# Patient Record
Sex: FEMALE | Race: WHITE | NOT HISPANIC OR LATINO | Employment: OTHER | URBAN - METROPOLITAN AREA
[De-identification: names, ages, dates, MRNs, and addresses within clinical notes are randomized per-mention and may not be internally consistent; named-entity substitution may affect disease eponyms.]

---

## 2017-06-29 ENCOUNTER — APPOINTMENT (OUTPATIENT)
Dept: OCCUPATIONAL THERAPY | Facility: CLINIC | Age: 57
End: 2017-06-29
Payer: COMMERCIAL

## 2017-06-29 ENCOUNTER — ALLSCRIPTS OFFICE VISIT (OUTPATIENT)
Dept: OTHER | Facility: OTHER | Age: 57
End: 2017-06-29

## 2017-06-29 DIAGNOSIS — S62.345A: ICD-10-CM

## 2017-06-29 DIAGNOSIS — S62.308A UNSPECIFIED FRACTURE OF OTHER METACARPAL BONE, INITIAL ENCOUNTER FOR CLOSED FRACTURE: ICD-10-CM

## 2017-06-29 PROCEDURE — 97760 ORTHOTIC MGMT&TRAING 1ST ENC: CPT

## 2017-06-30 ENCOUNTER — HOSPITAL ENCOUNTER (OUTPATIENT)
Dept: RADIOLOGY | Facility: HOSPITAL | Age: 57
Discharge: HOME | End: 2017-06-30
Attending: RADIOLOGY
Payer: COMMERCIAL

## 2017-06-30 DIAGNOSIS — Z76.89 REFERRAL OF PATIENT WITHOUT EXAMINATION OR TREATMENT: ICD-10-CM

## 2017-07-26 ENCOUNTER — APPOINTMENT (OUTPATIENT)
Dept: RADIOLOGY | Facility: CLINIC | Age: 57
End: 2017-07-26
Payer: COMMERCIAL

## 2017-07-26 ENCOUNTER — ALLSCRIPTS OFFICE VISIT (OUTPATIENT)
Dept: OTHER | Facility: OTHER | Age: 57
End: 2017-07-26

## 2017-07-26 DIAGNOSIS — S62.308A UNSPECIFIED FRACTURE OF OTHER METACARPAL BONE, INITIAL ENCOUNTER FOR CLOSED FRACTURE: ICD-10-CM

## 2017-07-26 PROCEDURE — 73130 X-RAY EXAM OF HAND: CPT

## 2018-01-12 VITALS — WEIGHT: 205.25 LBS | BODY MASS INDEX: 30.4 KG/M2 | HEIGHT: 69 IN

## 2018-01-14 VITALS
BODY MASS INDEX: 30.07 KG/M2 | HEART RATE: 74 BPM | DIASTOLIC BLOOD PRESSURE: 74 MMHG | SYSTOLIC BLOOD PRESSURE: 115 MMHG | WEIGHT: 203 LBS | HEIGHT: 69 IN

## 2018-03-30 ENCOUNTER — APPOINTMENT (EMERGENCY)
Dept: RADIOLOGY | Facility: HOSPITAL | Age: 58
DRG: 914 | End: 2018-03-30
Payer: COMMERCIAL

## 2018-03-30 ENCOUNTER — APPOINTMENT (INPATIENT)
Dept: RADIOLOGY | Facility: HOSPITAL | Age: 58
DRG: 914 | End: 2018-03-30
Payer: COMMERCIAL

## 2018-03-30 ENCOUNTER — HOSPITAL ENCOUNTER (INPATIENT)
Facility: HOSPITAL | Age: 58
LOS: 3 days | DRG: 914 | End: 2018-04-02
Attending: SURGERY | Admitting: SURGERY
Payer: COMMERCIAL

## 2018-03-30 DIAGNOSIS — F23 ACUTE PSYCHOSIS (HCC): ICD-10-CM

## 2018-03-30 DIAGNOSIS — T14.91XA SUICIDAL BEHAVIOR WITH ATTEMPTED SELF-INJURY (HCC): ICD-10-CM

## 2018-03-30 DIAGNOSIS — S01.01XA LACERATION OF SCALP: ICD-10-CM

## 2018-03-30 DIAGNOSIS — S05.90XA EYE INJURY: ICD-10-CM

## 2018-03-30 DIAGNOSIS — S68.119A: ICD-10-CM

## 2018-03-30 DIAGNOSIS — Z91.89 AT HIGH RISK FOR SELF HARM: ICD-10-CM
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
## 2018-03-30 PROBLEM — T07.XXXA MULTIPLE CONTUSIONS: Status: ACTIVE | Noted: 2018-03-30

## 2018-03-30 LAB
ABO GROUP BLD: NORMAL
AMPHETAMINES SERPL QL SCN: NEGATIVE
ANION GAP SERPL CALCULATED.3IONS-SCNC: 14 MMOL/L (ref 4–13)
APAP SERPL-MCNC: <2 UG/ML (ref 10–30)
APTT PPP: 31 SECONDS (ref 23–35)
BARBITURATES UR QL: NEGATIVE
BASE EXCESS BLDA CALC-SCNC: -5 MMOL/L (ref -2–3)
BASOPHILS # BLD AUTO: 0.01 THOUSANDS/ΜL (ref 0–0.1)
BASOPHILS NFR BLD AUTO: 0 % (ref 0–1)
BENZODIAZ UR QL: NEGATIVE
BLD GP AB SCN SERPL QL: NEGATIVE
BUN SERPL-MCNC: 20 MG/DL (ref 5–25)
CA-I BLD-SCNC: 1.08 MMOL/L (ref 1.12–1.32)
CALCIUM SERPL-MCNC: 8.6 MG/DL (ref 8.3–10.1)
CHLORIDE SERPL-SCNC: 100 MMOL/L (ref 100–108)
CO2 SERPL-SCNC: 18 MMOL/L (ref 21–32)
COCAINE UR QL: NEGATIVE
CREAT SERPL-MCNC: 1 MG/DL (ref 0.6–1.3)
EOSINOPHIL # BLD AUTO: 0.01 THOUSAND/ΜL (ref 0–0.61)
EOSINOPHIL NFR BLD AUTO: 0 % (ref 0–6)
ERYTHROCYTE [DISTWIDTH] IN BLOOD BY AUTOMATED COUNT: 12.1 % (ref 11.6–15.1)
ETHANOL SERPL-MCNC: <3 MG/DL (ref 0–3)
GFR SERPL CREATININE-BSD FRML MDRD: 63 ML/MIN/1.73SQ M
GLUCOSE SERPL-MCNC: 176 MG/DL (ref 65–140)
GLUCOSE SERPL-MCNC: 186 MG/DL (ref 65–140)
HCO3 BLDA-SCNC: 17 MMOL/L (ref 24–30)
HCT VFR BLD AUTO: 32.8 % (ref 34.8–46.1)
HCT VFR BLD CALC: 32 % (ref 34.8–46.1)
HGB BLD-MCNC: 11.7 G/DL (ref 11.5–15.4)
HGB BLDA-MCNC: 10.9 G/DL (ref 11.5–15.4)
INR PPP: 1.3 (ref 0.86–1.16)
LYMPHOCYTES # BLD AUTO: 0.93 THOUSANDS/ΜL (ref 0.6–4.47)
LYMPHOCYTES NFR BLD AUTO: 6 % (ref 14–44)
MCH RBC QN AUTO: 31.5 PG (ref 26.8–34.3)
MCHC RBC AUTO-ENTMCNC: 35.7 G/DL (ref 31.4–37.4)
MCV RBC AUTO: 88 FL (ref 82–98)
METHADONE UR QL: NEGATIVE
MONOCYTES # BLD AUTO: 1.22 THOUSAND/ΜL (ref 0.17–1.22)
MONOCYTES NFR BLD AUTO: 8 % (ref 4–12)
NEUTROPHILS # BLD AUTO: 13.92 THOUSANDS/ΜL (ref 1.85–7.62)
NEUTS SEG NFR BLD AUTO: 86 % (ref 43–75)
NRBC BLD AUTO-RTO: 0 /100 WBCS
OPIATES UR QL SCN: NEGATIVE
PCO2 BLD: 18 MMOL/L (ref 21–32)
PCO2 BLD: 22.6 MM HG (ref 42–50)
PCP UR QL: NEGATIVE
PH BLD: 7.49 [PH] (ref 7.3–7.4)
PLATELET # BLD AUTO: 208 THOUSANDS/UL (ref 149–390)
PLATELET # BLD AUTO: 297 THOUSANDS/UL (ref 149–390)
PMV BLD AUTO: 8.8 FL (ref 8.9–12.7)
PMV BLD AUTO: 9.4 FL (ref 8.9–12.7)
PO2 BLD: 40 MM HG (ref 35–45)
POTASSIUM BLD-SCNC: 3.8 MMOL/L (ref 3.5–5.3)
POTASSIUM SERPL-SCNC: 3.4 MMOL/L (ref 3.5–5.3)
PROTHROMBIN TIME: 16.3 SECONDS (ref 12.1–14.4)
RBC # BLD AUTO: 3.71 MILLION/UL (ref 3.81–5.12)
RH BLD: POSITIVE
SALICYLATES SERPL-MCNC: <3 MG/DL (ref 3–20)
SAO2 % BLD FROM PO2: 80 % (ref 95–98)
SODIUM BLD-SCNC: 131 MMOL/L (ref 136–145)
SODIUM SERPL-SCNC: 132 MMOL/L (ref 136–145)
SPECIMEN EXPIRATION DATE: NORMAL
SPECIMEN SOURCE: ABNORMAL
THC UR QL: NEGATIVE
WBC # BLD AUTO: 16.2 THOUSAND/UL (ref 4.31–10.16)

## 2018-03-30 PROCEDURE — 73130 X-RAY EXAM OF HAND: CPT

## 2018-03-30 PROCEDURE — 36415 COLL VENOUS BLD VENIPUNCTURE: CPT | Performed by: PHYSICIAN ASSISTANT

## 2018-03-30 PROCEDURE — 88305 TISSUE EXAM BY PATHOLOGIST: CPT | Performed by: PATHOLOGY

## 2018-03-30 PROCEDURE — 73560 X-RAY EXAM OF KNEE 1 OR 2: CPT

## 2018-03-30 PROCEDURE — 86901 BLOOD TYPING SEROLOGIC RH(D): CPT | Performed by: SURGERY

## 2018-03-30 PROCEDURE — 99223 1ST HOSP IP/OBS HIGH 75: CPT | Performed by: PHYSICIAN ASSISTANT

## 2018-03-30 PROCEDURE — 5A1935Z RESPIRATORY VENTILATION, LESS THAN 24 CONSECUTIVE HOURS: ICD-10-PCS | Performed by: SURGERY

## 2018-03-30 PROCEDURE — 94760 N-INVAS EAR/PLS OXIMETRY 1: CPT

## 2018-03-30 PROCEDURE — 85025 COMPLETE CBC W/AUTO DIFF WBC: CPT | Performed by: SURGERY

## 2018-03-30 PROCEDURE — 99291 CRITICAL CARE FIRST HOUR: CPT | Performed by: PHYSICIAN ASSISTANT

## 2018-03-30 PROCEDURE — 82947 ASSAY GLUCOSE BLOOD QUANT: CPT

## 2018-03-30 PROCEDURE — 88311 DECALCIFY TISSUE: CPT | Performed by: PATHOLOGY

## 2018-03-30 PROCEDURE — 0BH18EZ INSERTION OF ENDOTRACHEAL AIRWAY INTO TRACHEA, VIA NATURAL OR ARTIFICIAL OPENING ENDOSCOPIC: ICD-10-PCS | Performed by: SURGERY

## 2018-03-30 PROCEDURE — 74177 CT ABD & PELVIS W/CONTRAST: CPT

## 2018-03-30 PROCEDURE — 80329 ANALGESICS NON-OPIOID 1 OR 2: CPT | Performed by: SURGERY

## 2018-03-30 PROCEDURE — 90471 IMMUNIZATION ADMIN: CPT

## 2018-03-30 PROCEDURE — 71045 X-RAY EXAM CHEST 1 VIEW: CPT

## 2018-03-30 PROCEDURE — 90715 TDAP VACCINE 7 YRS/> IM: CPT | Performed by: PHYSICIAN ASSISTANT

## 2018-03-30 PROCEDURE — 72125 CT NECK SPINE W/O DYE: CPT

## 2018-03-30 PROCEDURE — 84295 ASSAY OF SERUM SODIUM: CPT

## 2018-03-30 PROCEDURE — 96374 THER/PROPH/DIAG INJ IV PUSH: CPT

## 2018-03-30 PROCEDURE — 85730 THROMBOPLASTIN TIME PARTIAL: CPT | Performed by: SURGERY

## 2018-03-30 PROCEDURE — 86850 RBC ANTIBODY SCREEN: CPT | Performed by: SURGERY

## 2018-03-30 PROCEDURE — 70450 CT HEAD/BRAIN W/O DYE: CPT

## 2018-03-30 PROCEDURE — 85610 PROTHROMBIN TIME: CPT | Performed by: SURGERY

## 2018-03-30 PROCEDURE — 85049 AUTOMATED PLATELET COUNT: CPT | Performed by: PHYSICIAN ASSISTANT

## 2018-03-30 PROCEDURE — 93005 ELECTROCARDIOGRAM TRACING: CPT

## 2018-03-30 PROCEDURE — 85014 HEMATOCRIT: CPT

## 2018-03-30 PROCEDURE — 73630 X-RAY EXAM OF FOOT: CPT

## 2018-03-30 PROCEDURE — 86900 BLOOD TYPING SEROLOGIC ABO: CPT | Performed by: SURGERY

## 2018-03-30 PROCEDURE — 80048 BASIC METABOLIC PNL TOTAL CA: CPT | Performed by: SURGERY

## 2018-03-30 PROCEDURE — 82803 BLOOD GASES ANY COMBINATION: CPT

## 2018-03-30 PROCEDURE — 0HQFXZZ REPAIR RIGHT HAND SKIN, EXTERNAL APPROACH: ICD-10-PCS | Performed by: ORTHOPAEDIC SURGERY

## 2018-03-30 PROCEDURE — 80307 DRUG TEST PRSMV CHEM ANLYZR: CPT | Performed by: SURGERY

## 2018-03-30 PROCEDURE — 71260 CT THORAX DX C+: CPT

## 2018-03-30 PROCEDURE — 82330 ASSAY OF CALCIUM: CPT

## 2018-03-30 PROCEDURE — 84132 ASSAY OF SERUM POTASSIUM: CPT

## 2018-03-30 PROCEDURE — 80320 DRUG SCREEN QUANTALCOHOLS: CPT | Performed by: SURGERY

## 2018-03-30 PROCEDURE — 99285 EMERGENCY DEPT VISIT HI MDM: CPT

## 2018-03-30 RX ORDER — PROPOFOL 10 MG/ML
5-50 INJECTION, EMULSION INTRAVENOUS
Status: DISCONTINUED | OUTPATIENT
Start: 2018-03-30 | End: 2018-03-30

## 2018-03-30 RX ORDER — VECURONIUM BROMIDE 1 MG/ML
INJECTION, POWDER, LYOPHILIZED, FOR SOLUTION INTRAVENOUS CODE/TRAUMA/SEDATION MEDICATION
Status: COMPLETED | OUTPATIENT
Start: 2018-03-30 | End: 2018-03-30

## 2018-03-30 RX ORDER — FENTANYL CITRATE 50 UG/ML
INJECTION, SOLUTION INTRAMUSCULAR; INTRAVENOUS CODE/TRAUMA/SEDATION MEDICATION
Status: COMPLETED | OUTPATIENT
Start: 2018-03-30 | End: 2018-03-30

## 2018-03-30 RX ORDER — SODIUM CHLORIDE 9 MG/ML
125 INJECTION, SOLUTION INTRAVENOUS CONTINUOUS
Status: DISCONTINUED | OUTPATIENT
Start: 2018-03-30 | End: 2018-03-30 | Stop reason: SDUPTHER

## 2018-03-30 RX ORDER — POTASSIUM CHLORIDE 14.9 MG/ML
20 INJECTION INTRAVENOUS
Status: COMPLETED | OUTPATIENT
Start: 2018-03-30 | End: 2018-03-30

## 2018-03-30 RX ORDER — OXYCODONE HYDROCHLORIDE 10 MG/1
10 TABLET ORAL EVERY 4 HOURS PRN
Status: DISCONTINUED | OUTPATIENT
Start: 2018-03-30 | End: 2018-04-02 | Stop reason: HOSPADM

## 2018-03-30 RX ORDER — PROPOFOL 10 MG/ML
INJECTION, EMULSION INTRAVENOUS CODE/TRAUMA/SEDATION MEDICATION
Status: COMPLETED | OUTPATIENT
Start: 2018-03-30 | End: 2018-03-30

## 2018-03-30 RX ORDER — CHLORHEXIDINE GLUCONATE 0.12 MG/ML
15 RINSE ORAL EVERY 12 HOURS SCHEDULED
Status: DISCONTINUED | OUTPATIENT
Start: 2018-03-30 | End: 2018-03-30

## 2018-03-30 RX ORDER — GINSENG 100 MG
1 CAPSULE ORAL 2 TIMES DAILY
Status: DISCONTINUED | OUTPATIENT
Start: 2018-03-30 | End: 2018-04-02 | Stop reason: HOSPADM

## 2018-03-30 RX ORDER — SUCCINYLCHOLINE CHLORIDE 20 MG/ML
INJECTION INTRAMUSCULAR; INTRAVENOUS CODE/TRAUMA/SEDATION MEDICATION
Status: COMPLETED | OUTPATIENT
Start: 2018-03-30 | End: 2018-03-30

## 2018-03-30 RX ORDER — OXYCODONE HYDROCHLORIDE 5 MG/1
5 TABLET ORAL EVERY 4 HOURS PRN
Status: DISCONTINUED | OUTPATIENT
Start: 2018-03-30 | End: 2018-04-02 | Stop reason: HOSPADM

## 2018-03-30 RX ORDER — ACETAMINOPHEN 325 MG/1
325 TABLET ORAL EVERY 6 HOURS PRN
Status: DISCONTINUED | OUTPATIENT
Start: 2018-03-30 | End: 2018-03-31

## 2018-03-30 RX ORDER — ETOMIDATE 2 MG/ML
INJECTION INTRAVENOUS CODE/TRAUMA/SEDATION MEDICATION
Status: COMPLETED | OUTPATIENT
Start: 2018-03-30 | End: 2018-03-30

## 2018-03-30 RX ORDER — SODIUM CHLORIDE 9 MG/ML
125 INJECTION, SOLUTION INTRAVENOUS CONTINUOUS
Status: DISCONTINUED | OUTPATIENT
Start: 2018-03-30 | End: 2018-03-30

## 2018-03-30 RX ORDER — CHLORHEXIDINE GLUCONATE 0.12 MG/ML
15 RINSE ORAL EVERY 12 HOURS SCHEDULED
Status: DISCONTINUED | OUTPATIENT
Start: 2018-03-30 | End: 2018-03-30 | Stop reason: SDUPTHER

## 2018-03-30 RX ADMIN — TETANUS TOXOID, REDUCED DIPHTHERIA TOXOID AND ACELLULAR PERTUSSIS VACCINE, ADSORBED 0.5 ML: 5; 2.5; 8; 8; 2.5 SUSPENSION INTRAMUSCULAR at 11:20

## 2018-03-30 RX ADMIN — PROPOFOL 40 MCG/KG/MIN: 10 INJECTION, EMULSION INTRAVENOUS at 18:32

## 2018-03-30 RX ADMIN — PROPOFOL 10 MCG/KG/MIN: 10 INJECTION, EMULSION INTRAVENOUS at 11:33

## 2018-03-30 RX ADMIN — CEFAZOLIN SODIUM 2000 MG: 2 SOLUTION INTRAVENOUS at 11:32

## 2018-03-30 RX ADMIN — ETOMIDATE 30 MG: 2 INJECTION INTRAVENOUS at 11:16

## 2018-03-30 RX ADMIN — CEFAZOLIN SODIUM 2000 MG: 2 SOLUTION INTRAVENOUS at 20:08

## 2018-03-30 RX ADMIN — FENTANYL CITRATE 25 MCG/HR: at 12:33

## 2018-03-30 RX ADMIN — VECURONIUM BROMIDE 10 MG: 1 INJECTION, POWDER, LYOPHILIZED, FOR SOLUTION INTRAVENOUS at 11:30

## 2018-03-30 RX ADMIN — FENTANYL CITRATE 100 MCG: 50 INJECTION, SOLUTION INTRAMUSCULAR; INTRAVENOUS at 11:25

## 2018-03-30 RX ADMIN — PROPOFOL 50 MCG/KG/MIN: 10 INJECTION, EMULSION INTRAVENOUS at 17:22

## 2018-03-30 RX ADMIN — PROPOFOL 40 MCG/KG/MIN: 10 INJECTION, EMULSION INTRAVENOUS at 14:37

## 2018-03-30 RX ADMIN — POTASSIUM CHLORIDE 20 MEQ: 200 INJECTION, SOLUTION INTRAVENOUS at 18:01

## 2018-03-30 RX ADMIN — SODIUM CHLORIDE 125 ML/HR: 0.9 INJECTION, SOLUTION INTRAVENOUS at 15:47

## 2018-03-30 RX ADMIN — SUCCINYLCHOLINE CHLORIDE 100 MG: 20 INJECTION, SOLUTION INTRAMUSCULAR; INTRAVENOUS at 11:17

## 2018-03-30 RX ADMIN — IOHEXOL 100 ML: 350 INJECTION, SOLUTION INTRAVENOUS at 11:56

## 2018-03-30 RX ADMIN — POTASSIUM CHLORIDE 20 MEQ: 200 INJECTION, SOLUTION INTRAVENOUS at 15:54

## 2018-03-30 RX ADMIN — PROPOFOL 40 MG: 10 INJECTION, EMULSION INTRAVENOUS at 11:27

## 2018-03-30 RX ADMIN — CHLORHEXIDINE GLUCONATE 15 ML: 1.2 RINSE ORAL at 20:07

## 2018-03-30 NOTE — RESPIRATORY THERAPY NOTE
RT Ventilator Management Note  Venetta Burn 63 y o  female MRN: [id number]  Unit/Bed#: ICU 04 Encounter: [id number]      Daily Screen       3/30/2018 1212             Patient safety screen outcome[de-identified] Failed    Not Ready for Weaning due to[de-identified] Underline problem not resolved            Physical Exam:   Assessment Type: (P) Assess only  General Appearance: Sedated  Respiratory Pattern: (P) Assisted  Chest Assessment: (P) Chest expansion symmetrical  Bilateral Breath Sounds: (P) Clear  R Breath Sounds: (P) Clear  L Breath Sounds: (P) Clear  O2 Device: vent      Resp Comments: (P) Pt found on PSV and is tolerating well  VS are stable @ this time  Will continue to monitor through out the shift
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
## 2018-03-30 NOTE — TRAUMA DOCUMENTATION
Patient states she has 5 minutes to leave her body to fulfill a mission  States she can not have oxygen in her mouth under any circumstances  Patient continuing to stay she is ok and that she needs left alone to leave her body for 5 minutes
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
## 2018-03-30 NOTE — RESPIRATORY THERAPY NOTE
RT Ventilator Management Note  Aleksandr Stephenson 63 y o  female MRN: [id number]  Unit/Bed#: ICU 04 Encounter: [id number]      Daily Screen       3/30/2018 1212             Patient safety screen outcome[de-identified] (P)  Failed    Not Ready for Weaning due to[de-identified] (P)  Underline problem not resolved            Physical Exam:   Assessment Type: Assess only  O2 Device: vent      Resp Comments: pt arrived from ER  Placed on vent   Check done
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
## 2018-03-30 NOTE — SOCIAL WORK
CM was informed by [facility] with Pastoral care that pt's neighbor Adamaris Peace [phone] gave pt's dog to their common   CM will follow

## 2018-03-30 NOTE — ED PROCEDURE NOTE
Procedure  Intubation  Date/Time: 3/30/2018 11:58 AM  Performed by: César Loo by: Christiano Hamm     Patient location:  ED  Consent:     Consent obtained:  Emergent situation  Universal protocol:     Patient identity confirmed:  Arm band  Pre-procedure details:     Patient status:  Altered mental status    Pretreatment medications:  Etomidate    Paralytics:  Succinylcholine  Indications:     Indications for intubation: airway protection    Procedure details:     Preoxygenation:  Nasal cannula    CPR in progress: no      Intubation method:  Oral    Oral intubation technique:  Glidescope    Laryngoscope blade: Mac 3    Tube size (mm):  8 0    Tube type:  Cuffed    Number of attempts:  1    Ventilation between attempts: no      Cricoid pressure: no      Tube visualized through cords: yes    Placement assessment:     ETT to lip:  26    Tube secured with:  ETT galdamez    Breath sounds:  Equal    Placement verification: chest rise, condensation, CXR verification, direct visualization, equal breath sounds and tube exhalation      CXR findings:  ETT in proper place    Ventilator settings:  15/450/50/5  Post-procedure details:     Patient tolerance of procedure:   Tolerated well, no immediate complications                     Kareen Kerns MD  03/30/18 0936

## 2018-03-30 NOTE — PROGRESS NOTES
Rapid drug screen sent after pt   Came from ER and meds were given to pt , told to still send to lab
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
## 2018-03-31 PROBLEM — T14.91XA SUICIDAL BEHAVIOR WITH ATTEMPTED SELF-INJURY (HCC): Status: ACTIVE | Noted: 2018-03-31

## 2018-03-31 PROBLEM — F23 ACUTE PSYCHOSIS (HCC): Status: ACTIVE | Noted: 2018-03-31

## 2018-03-31 PROCEDURE — 99231 SBSQ HOSP IP/OBS SF/LOW 25: CPT | Performed by: ORTHOPAEDIC SURGERY

## 2018-03-31 PROCEDURE — 93005 ELECTROCARDIOGRAM TRACING: CPT

## 2018-03-31 PROCEDURE — 99232 SBSQ HOSP IP/OBS MODERATE 35: CPT | Performed by: SURGERY

## 2018-03-31 RX ORDER — TROPICAMIDE 5 MG/ML
1 SOLUTION/ DROPS OPHTHALMIC ONCE
Status: COMPLETED | OUTPATIENT
Start: 2018-04-01 | End: 2018-04-01

## 2018-03-31 RX ORDER — ACETAMINOPHEN 325 MG/1
650 TABLET ORAL EVERY 6 HOURS PRN
Status: DISCONTINUED | OUTPATIENT
Start: 2018-03-31 | End: 2018-04-02

## 2018-03-31 RX ORDER — LORAZEPAM 2 MG/ML
2 INJECTION INTRAMUSCULAR ONCE
Status: COMPLETED | OUTPATIENT
Start: 2018-03-31 | End: 2018-03-31

## 2018-03-31 RX ORDER — HALOPERIDOL 5 MG/ML
5 INJECTION INTRAMUSCULAR ONCE
Status: COMPLETED | OUTPATIENT
Start: 2018-03-31 | End: 2018-03-31

## 2018-03-31 RX ORDER — TROPICAMIDE 5 MG/ML
1 SOLUTION/ DROPS OPHTHALMIC ONCE
Status: DISCONTINUED | OUTPATIENT
Start: 2018-03-31 | End: 2018-03-31

## 2018-03-31 RX ORDER — HALOPERIDOL 5 MG/ML
5 INJECTION INTRAMUSCULAR EVERY 4 HOURS PRN
Status: DISCONTINUED | OUTPATIENT
Start: 2018-03-31 | End: 2018-04-02

## 2018-03-31 RX ADMIN — HALOPERIDOL LACTATE 5 MG: 5 INJECTION, SOLUTION INTRAMUSCULAR at 07:53

## 2018-03-31 RX ADMIN — HALOPERIDOL LACTATE 5 MG: 5 INJECTION, SOLUTION INTRAMUSCULAR at 00:43

## 2018-03-31 RX ADMIN — CEFAZOLIN SODIUM 2000 MG: 2 SOLUTION INTRAVENOUS at 15:47

## 2018-03-31 RX ADMIN — BACITRACIN ZINC 1 LARGE APPLICATION: 500 OINTMENT TOPICAL at 19:05

## 2018-03-31 RX ADMIN — LORAZEPAM 2 MG: 2 INJECTION INTRAMUSCULAR; INTRAVENOUS at 00:56

## 2018-03-31 RX ADMIN — BACITRACIN ZINC 1 LARGE APPLICATION: 500 OINTMENT TOPICAL at 00:29

## 2018-03-31 RX ADMIN — CEFAZOLIN SODIUM 2000 MG: 2 SOLUTION INTRAVENOUS at 21:17

## 2018-03-31 RX ADMIN — CEFAZOLIN SODIUM 2000 MG: 2 SOLUTION INTRAVENOUS at 04:00

## 2018-03-31 RX ADMIN — ENOXAPARIN SODIUM 30 MG: 30 INJECTION SUBCUTANEOUS at 21:17

## 2018-03-31 RX ADMIN — ENOXAPARIN SODIUM 40 MG: 40 INJECTION SUBCUTANEOUS at 08:24

## 2018-03-31 NOTE — PLAN OF CARE
Problem: Prexisting or High Potential for Compromised Skin Integrity  Goal: Skin integrity is maintained or improved  INTERVENTIONS:  - Identify patients at risk for skin breakdown  - Assess and monitor skin integrity  - Assess and monitor nutrition and hydration status  - Monitor labs (i e  albumin)  - Assess for incontinence   - Turn and reposition patient  - Assist with mobility/ambulation  - Relieve pressure over bony prominences  - Avoid friction and shearing  - Provide appropriate hygiene as needed including keeping skin clean and dry  - Evaluate need for skin moisturizer/barrier cream  - Collaborate with interdisciplinary team (i e  Nutrition, Rehabilitation, etc )   - Patient/family teaching   Outcome: Progressing      Problem: Potential for Falls  Goal: Patient will remain free of falls  INTERVENTIONS:  - Assess patient frequently for physical needs  -  Identify cognitive and physical deficits and behaviors that affect risk of falls    -  [place] fall precautions as indicated by assessment   - Educate patient/family on patient safety including physical limitations  - Instruct patient to call for assistance with activity based on assessment  - Modify environment to reduce risk of injury  - Consider OT/PT consult to assist with strengthening/mobility   Outcome: Progressing      Problem: PAIN - ADULT  Goal: Verbalizes/displays adequate comfort level or baseline comfort level  Interventions:  - Encourage patient to monitor pain and request assistance  - Assess pain using appropriate pain scale  - Administer analgesics based on type and severity of pain and evaluate response  - Implement non-pharmacological measures as appropriate and evaluate response  - Consider cultural and social influences on pain and pain management  - Notify physician/advanced practitioner if interventions unsuccessful or patient reports new pain   Outcome: Progressing      Problem: INFECTION - ADULT  Goal: Absence or prevention of progression during hospitalization  INTERVENTIONS:  - Assess and monitor for signs and symptoms of infection  - Monitor lab/diagnostic results  - Monitor all insertion sites, i e  indwelling lines, tubes, and drains  - Monitor endotracheal (as able) and nasal secretions for changes in amount and color  - [place] appropriate cooling/warming therapies per order  - Administer medications as ordered  - Instruct and encourage patient and family to use good hand hygiene technique  - Identify and instruct in appropriate isolation precautions for identified infection/condition   Outcome: Progressing    Goal: Absence of fever/infection during neutropenic period  INTERVENTIONS:  - Monitor WBC  - Implement neutropenic guidelines   Outcome: Progressing      Problem: SAFETY ADULT  Goal: Maintain or return to baseline ADL function  INTERVENTIONS:  -  Assess patient's ability to carry out ADLs; assess patient's baseline for ADL function and identify physical deficits which impact ability to perform ADLs (bathing, care of mouth/teeth, toileting, grooming, dressing, etc )  - Assess/evaluate cause of self-care deficits   - Assess range of motion  - Assess patient's mobility; develop plan if impaired  - Assess patient's need for assistive devices and provide as appropriate  - Encourage maximum independence but intervene and supervise when necessary  ¯ Involve family in performance of ADLs  ¯ Assess for home care needs following discharge   ¯ Request OT consult to assist with ADL evaluation and planning for discharge  ¯ Provide patient education as appropriate   Outcome: Progressing    Goal: Maintain or return mobility status to optimal level  INTERVENTIONS:  - Assess patient's baseline mobility status (ambulation, transfers, stairs, etc )    - Identify cognitive and physical deficits and behaviors that affect mobility  - Identify mobility aids required to assist with transfers and/or ambulation (gait belt, sit-to-stand, lift, walker, cane, etc )  - [place] fall precautions as indicated by assessment  - Record patient progress and toleration of activity level on Mobility SBAR; progress patient to next Phase/Stage  - Instruct patient to call for assistance with activity based on assessment  - Request Rehabilitation consult to assist with strengthening/weightbearing, etc    Outcome: Progressing      Problem: DISCHARGE PLANNING  Goal: Discharge to home or other facility with appropriate resources  INTERVENTIONS:  - Identify barriers to discharge w/patient and caregiver  - Arrange for needed discharge resources and transportation as appropriate  - Identify discharge learning needs (meds, wound care, etc )  - Arrange for interpretive services to assist at discharge as needed  - Refer to Case Management Department for coordinating discharge planning if the patient needs post-hospital services based on physician/advanced practitioner order or complex needs related to functional status, cognitive ability, or social support system   Outcome: Progressing      Problem: Knowledge Deficit  Goal: Patient/family/caregiver demonstrates understanding of disease process, treatment plan, medications, and discharge instructions  Complete learning assessment and assess knowledge base    Interventions:  - Provide teaching at level of understanding  - Provide teaching via preferred learning methods   Outcome: Progressing      Problem: SAFETY,RESTRAINT: NV/NON-SELF DESTRUCTIVE BEHAVIOR  Goal: Remains free of harm/injury (restraint for non violent/non self-detsructive behavior)  INTERVENTIONS:  - Instruct patient/family regarding restraint use   - Assess and monitor physiologic and psychological status   - Provide interventions and comfort measures to meet assessed patient needs   - Identify and implement measures to help patient regain control  - Assess readiness for release of restraint    Outcome: Progressing    Goal: Returns to optimal restraint-free functioning  INTERVENTIONS:  - Assess the patient's behavior and symptoms that indicate continued need for restraint  - Identify and implement measures to help patient regain control  - Assess readiness for release of restraint    Outcome: Progressing
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
## 2018-03-31 NOTE — SOCIAL WORK
CM faxed Pt's [phone] to [place] upon completion  Pt not medically cleared for d/c at this time  CM will continue to follow
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
## 2018-03-31 NOTE — NURSING NOTE
Pt transferred to [id number] by stretcher  pca and nurse with pt  Waist belt on pt  Backpack and security tags for belongings with pt
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
## 2018-03-31 NOTE — DISCHARGE INSTRUCTIONS
Discharge Instructions - Orthopedics  Lisa Stephenson 63 y o  female MRN: [id number]  Unit/Bed#: ICU 4-01    Weight Bearing Status:                                           Non weight bearing left upper extremity in splint     Pain:  Continue analgesics as directed    Dressing Instructions:   Keep dressing clean, dry and intact until follow up appointment  Appt Instructions: If you do not have your appointment, please call the clinic at [phone] to f/u with Dr Pretty Montanez in 1 week      Contact the office sooner if you experience any increased numbness/tingling in the extremities

## 2018-03-31 NOTE — PROGRESS NOTES
-Orthopedics   Aleksandr Stephenson 63 y o  female MRN: [id number]  Unit/Bed#: ICU 4-01    S: Patient extubated last night  She voices no complaints this morning  Denies numbness or tingling in left hand  Denies pain  States that she is protected by God as she is in a period of atonement  Allergies:   No Known Allergies        Labs:    0  Lab Value Date/Time   HCT 32 8 (L) 03/30/2018 1124   HGB 11 7 03/30/2018 1124   HGB 10 9 (L) 03/30/2018 1105   INR 1 30 (H) 03/30/2018 1124   WBC 16 20 (H) 03/30/2018 1124       Meds:    Current Facility-Administered Medications:     acetaminophen (TYLENOL) tablet 325 mg, 325 mg, Oral, Q6H PRN, Yisel Flores MD    bacitracin topical ointment 1 large application, 1 large application, Topical, BID, Yisel Flores MD, 1 large application at 73/06/82 0029    ceFAZolin (ANCEF) IVPB (premix) 2,000 mg, 2,000 mg, Intravenous, Q8H, Madison Li PA-C, Stopped at 03/31/18 0500    enoxaparin (LOVENOX) subcutaneous injection 40 mg, 40 mg, Subcutaneous, Daily, Rick Castro PA-C    HYDROmorphone (DILAUDID) injection 0 5 mg, 0 5 mg, Intravenous, Q3H PRN, Yisel Flores MD    oxyCODONE (ROXICODONE) immediate release tablet 10 mg, 10 mg, Oral, Q4H PRN, Yisel Flores MD    oxyCODONE (ROXICODONE) IR tablet 5 mg, 5 mg, Oral, Q4H PRN, Yisel Flores MD    Blood Culture:   No results found for: BLOODCX    Wound Culture:   No results found for: WOUNDCULT    Ins and Outs:  I/O last 24 hours:   In: 1586 1 [I V :1264 4; NG/GT:30; IV Piggyback:291 7]  Out: 8468 [Urine:1175]          Physical Exam:   BP (!) 81/43   Pulse 78   Temp 99 7 °F (37 6 °C)   Resp 19   Ht 5' 3" (1 6 m)   Wt 91 5 kg (201 lb 11 5 oz)   SpO2 100%   BMI 35 73 kg/m²   Gen: AAOX3, supine in bed, believes she's in a period of atonement  Musculoskeletal: left upper extremity  · Splint CDI  · Brisk cap refill in remaining digits   · Moving all digits distally in splint   · SILT m/r/u distributions     _*_*_*_*_*_*_*_*_*_*_*_*_*_*_*_*_*_*_*_*_*_*_*_*_*_*_*_*_*_*_*_*_*_*_*_*_*_*_*_*_*    Assessment:  63 y o  female with left Complete ring finger amputation status post bedside washout, revision amputation just distal to level of MCP, and splint application      Plan:   · Maintain finger in sterile dressing, splint  · Analgesics for pain  · Antibiotics- IV ancef   · F/u Dena in 1 week  · Will SO following tertiary exam      Hallie Roche MD
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
## 2018-04-01 LAB
ATRIAL RATE: 67 BPM
ATRIAL RATE: 88 BPM
P AXIS: 46 DEGREES
P AXIS: 56 DEGREES
PR INTERVAL: 138 MS
PR INTERVAL: 150 MS
QRS AXIS: 20 DEGREES
QRS AXIS: 46 DEGREES
QRSD INTERVAL: 75 MS
QRSD INTERVAL: 79 MS
QT INTERVAL: 375 MS
QT INTERVAL: 400 MS
QTC INTERVAL: 423 MS
QTC INTERVAL: 454 MS
T WAVE AXIS: 26 DEGREES
T WAVE AXIS: 44 DEGREES
VENTRICULAR RATE: 67 BPM
VENTRICULAR RATE: 88 BPM

## 2018-04-01 PROCEDURE — 93010 ELECTROCARDIOGRAM REPORT: CPT | Performed by: INTERNAL MEDICINE

## 2018-04-01 PROCEDURE — 99232 SBSQ HOSP IP/OBS MODERATE 35: CPT | Performed by: SURGERY

## 2018-04-01 RX ADMIN — CEFAZOLIN SODIUM 2000 MG: 2 SOLUTION INTRAVENOUS at 05:14

## 2018-04-01 RX ADMIN — CEFAZOLIN SODIUM 2000 MG: 2 SOLUTION INTRAVENOUS at 12:06

## 2018-04-01 RX ADMIN — TROPICAMIDE 1 DROP: 5 SOLUTION/ DROPS OPHTHALMIC at 12:06

## 2018-04-01 RX ADMIN — BACITRACIN ZINC 1 LARGE APPLICATION: 500 OINTMENT TOPICAL at 08:57

## 2018-04-01 RX ADMIN — ENOXAPARIN SODIUM 30 MG: 30 INJECTION SUBCUTANEOUS at 20:03

## 2018-04-01 RX ADMIN — ACETAMINOPHEN 650 MG: 325 TABLET, FILM COATED ORAL at 00:20

## 2018-04-01 RX ADMIN — ENOXAPARIN SODIUM 30 MG: 30 INJECTION SUBCUTANEOUS at 08:57

## 2018-04-01 RX ADMIN — BACITRACIN ZINC 1 LARGE APPLICATION: 500 OINTMENT TOPICAL at 18:23

## 2018-04-01 RX ADMIN — ACETAMINOPHEN 650 MG: 325 TABLET, FILM COATED ORAL at 19:58

## 2018-04-01 NOTE — PLAN OF CARE

## 2018-04-01 NOTE — PLAN OF CARE
DISCHARGE PLANNING     Discharge to home or other facility with appropriate resources Progressing        INFECTION - ADULT     Absence or prevention of progression during hospitalization Progressing     Absence of fever/infection during neutropenic period Progressing        Knowledge Deficit     Patient/family/caregiver demonstrates understanding of disease process, treatment plan, medications, and discharge instructions Progressing        Nutrition/Hydration-ADULT     Nutrient/Hydration intake appropriate for improving, restoring or maintaining nutritional needs Progressing        PAIN - ADULT     Verbalizes/displays adequate comfort level or baseline comfort level Progressing        Potential for Falls     Patient will remain free of falls Progressing        Prexisting or High Potential for Compromised Skin Integrity     Skin integrity is maintained or improved Progressing        SAFETY ADULT     Maintain or return to baseline ADL function Progressing     Maintain or return mobility status to optimal level Progressing        SAFETY,RESTRAINT: NV/NON-SELF DESTRUCTIVE BEHAVIOR     Remains free of harm/injury (restraint for non violent/non self-detsructive behavior) Progressing     Returns to optimal restraint-free functioning Progressing
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
## 2018-04-01 NOTE — PROGRESS NOTES
Trauma Res notified pt occassionally removing Vista collar, pt encouraged to keep collar on  No new orders

## 2018-04-02 ENCOUNTER — HOSPITAL ENCOUNTER (INPATIENT)
Facility: HOSPITAL | Age: 58
LOS: 21 days | Discharge: HOME | DRG: 885 | End: 2018-04-23
Attending: PSYCHIATRY & NEUROLOGY | Admitting: PSYCHIATRY & NEUROLOGY
Payer: COMMERCIAL

## 2018-04-02 VITALS
SYSTOLIC BLOOD PRESSURE: 110 MMHG | HEART RATE: 82 BPM | HEIGHT: 63 IN | BODY MASS INDEX: 35.62 KG/M2 | RESPIRATION RATE: 16 BRPM | DIASTOLIC BLOOD PRESSURE: 64 MMHG | WEIGHT: 201.06 LBS | OXYGEN SATURATION: 96 % | TEMPERATURE: 98.6 F

## 2018-04-02 DIAGNOSIS — R60.9 SWELLING: ICD-10-CM

## 2018-04-02 DIAGNOSIS — S68.119D TRAUMATIC AMPUTATION OF FINGER OF LEFT HAND, SUBSEQUENT ENCOUNTER: ICD-10-CM

## 2018-04-02 DIAGNOSIS — F23 ACUTE PSYCHOSIS (HCC): ICD-10-CM

## 2018-04-02 DIAGNOSIS — I83.891 VARICOSE VEINS OF RIGHT LEG WITH EDEMA: ICD-10-CM

## 2018-04-02 DIAGNOSIS — R60.0 LEG EDEMA, RIGHT: ICD-10-CM

## 2018-04-02 DIAGNOSIS — T07.XXXA MULTIPLE CONTUSIONS: Primary | ICD-10-CM

## 2018-04-02 DIAGNOSIS — R52 PAIN: ICD-10-CM

## 2018-04-02 DIAGNOSIS — G25.1 DRUG-INDUCED TREMOR: ICD-10-CM

## 2018-04-02 DIAGNOSIS — F29 PSYCHOSIS, ATYPICAL (HCC): ICD-10-CM

## 2018-04-02 PROCEDURE — 99238 HOSP IP/OBS DSCHRG MGMT 30/<: CPT | Performed by: NURSE PRACTITIONER

## 2018-04-02 RX ORDER — ACETAMINOPHEN, ASPIRIN AND CAFFEINE 250; 250; 65 MG/1; MG/1; MG/1
2 TABLET, FILM COATED ORAL EVERY 8 HOURS PRN
COMMUNITY
End: 2018-04-23 | Stop reason: HOSPADM

## 2018-04-02 RX ORDER — RISPERIDONE 1 MG/1
1 TABLET, ORALLY DISINTEGRATING ORAL EVERY 8 HOURS PRN
Status: CANCELLED | OUTPATIENT
Start: 2018-04-02

## 2018-04-02 RX ORDER — GINSENG 100 MG
1 CAPSULE ORAL 2 TIMES DAILY
Status: CANCELLED | OUTPATIENT
Start: 2018-04-02

## 2018-04-02 RX ORDER — OLANZAPINE 10 MG/1
10 INJECTION, POWDER, LYOPHILIZED, FOR SOLUTION INTRAMUSCULAR EVERY 8 HOURS PRN
Status: DISCONTINUED | OUTPATIENT
Start: 2018-04-02 | End: 2018-04-23 | Stop reason: HOSPADM

## 2018-04-02 RX ORDER — OXYCODONE HYDROCHLORIDE 10 MG/1
10 TABLET ORAL EVERY 4 HOURS PRN
Status: CANCELLED | OUTPATIENT
Start: 2018-04-02

## 2018-04-02 RX ORDER — HALOPERIDOL 5 MG
5 TABLET ORAL EVERY 8 HOURS PRN
Status: CANCELLED | OUTPATIENT
Start: 2018-04-02

## 2018-04-02 RX ORDER — OLANZAPINE 10 MG/1
5 INJECTION, POWDER, LYOPHILIZED, FOR SOLUTION INTRAMUSCULAR EVERY 8 HOURS PRN
Status: CANCELLED | OUTPATIENT
Start: 2018-04-02

## 2018-04-02 RX ORDER — OXYCODONE HYDROCHLORIDE 5 MG/1
5 TABLET ORAL EVERY 4 HOURS PRN
Status: CANCELLED | OUTPATIENT
Start: 2018-04-02

## 2018-04-02 RX ORDER — RISPERIDONE 1 MG/1
1 TABLET, ORALLY DISINTEGRATING ORAL EVERY 8 HOURS PRN
Status: DISCONTINUED | OUTPATIENT
Start: 2018-04-02 | End: 2018-04-23 | Stop reason: HOSPADM

## 2018-04-02 RX ORDER — LORAZEPAM 1 MG/1
1 TABLET ORAL EVERY 4 HOURS PRN
Status: CANCELLED | OUTPATIENT
Start: 2018-04-02

## 2018-04-02 RX ORDER — BENZTROPINE MESYLATE 1 MG/1
1 TABLET ORAL EVERY 8 HOURS PRN
Status: CANCELLED | OUTPATIENT
Start: 2018-04-02

## 2018-04-02 RX ORDER — ACETAMINOPHEN 325 MG/1
650 TABLET ORAL EVERY 6 HOURS PRN
Status: CANCELLED | OUTPATIENT
Start: 2018-04-02

## 2018-04-02 RX ORDER — HALOPERIDOL 5 MG/ML
5 INJECTION INTRAMUSCULAR EVERY 8 HOURS PRN
Status: DISCONTINUED | OUTPATIENT
Start: 2018-04-02 | End: 2018-04-23 | Stop reason: HOSPADM

## 2018-04-02 RX ORDER — BENZTROPINE MESYLATE 1 MG/ML
1 INJECTION INTRAMUSCULAR; INTRAVENOUS EVERY 8 HOURS PRN
Status: CANCELLED | OUTPATIENT
Start: 2018-04-02

## 2018-04-02 RX ORDER — LORAZEPAM 2 MG/ML
1 INJECTION INTRAMUSCULAR EVERY 6 HOURS PRN
Status: DISCONTINUED | OUTPATIENT
Start: 2018-04-02 | End: 2018-04-23 | Stop reason: HOSPADM

## 2018-04-02 RX ORDER — IBUPROFEN 400 MG/1
400 TABLET ORAL EVERY 8 HOURS PRN
Status: DISCONTINUED | OUTPATIENT
Start: 2018-04-02 | End: 2018-04-23 | Stop reason: HOSPADM

## 2018-04-02 RX ORDER — TRAZODONE HYDROCHLORIDE 50 MG/1
50 TABLET ORAL
Status: CANCELLED | OUTPATIENT
Start: 2018-04-02

## 2018-04-02 RX ORDER — MAGNESIUM HYDROXIDE/ALUMINUM HYDROXICE/SIMETHICONE 120; 1200; 1200 MG/30ML; MG/30ML; MG/30ML
15 SUSPENSION ORAL EVERY 4 HOURS PRN
Status: CANCELLED | OUTPATIENT
Start: 2018-04-02

## 2018-04-02 RX ORDER — LORAZEPAM 1 MG/1
1 TABLET ORAL EVERY 6 HOURS PRN
Status: DISCONTINUED | OUTPATIENT
Start: 2018-04-02 | End: 2018-04-23 | Stop reason: HOSPADM

## 2018-04-02 RX ORDER — GINSENG 100 MG
1 CAPSULE ORAL 2 TIMES DAILY
Status: DISCONTINUED | OUTPATIENT
Start: 2018-04-02 | End: 2018-04-13

## 2018-04-02 RX ORDER — TRAMADOL HYDROCHLORIDE 50 MG/1
50 TABLET ORAL EVERY 6 HOURS PRN
Status: DISCONTINUED | OUTPATIENT
Start: 2018-04-02 | End: 2018-04-23 | Stop reason: HOSPADM

## 2018-04-02 RX ORDER — ZOLPIDEM TARTRATE 5 MG/1
5 TABLET ORAL
Status: DISCONTINUED | OUTPATIENT
Start: 2018-04-02 | End: 2018-04-23 | Stop reason: HOSPADM

## 2018-04-02 RX ORDER — HYDROXYZINE HYDROCHLORIDE 25 MG/1
25 TABLET, FILM COATED ORAL EVERY 4 HOURS PRN
Status: CANCELLED | OUTPATIENT
Start: 2018-04-02

## 2018-04-02 RX ORDER — HALOPERIDOL 5 MG
5 TABLET ORAL EVERY 8 HOURS PRN
Status: DISCONTINUED | OUTPATIENT
Start: 2018-04-02 | End: 2018-04-23 | Stop reason: HOSPADM

## 2018-04-02 RX ORDER — ACETAMINOPHEN 325 MG/1
650 TABLET ORAL EVERY 6 HOURS PRN
Status: DISCONTINUED | OUTPATIENT
Start: 2018-04-02 | End: 2018-04-23 | Stop reason: HOSPADM

## 2018-04-02 RX ORDER — LORAZEPAM 2 MG/ML
1 INJECTION INTRAMUSCULAR EVERY 4 HOURS PRN
Status: CANCELLED | OUTPATIENT
Start: 2018-04-02

## 2018-04-02 RX ADMIN — ACETAMINOPHEN 650 MG: 325 TABLET, FILM COATED ORAL at 02:47

## 2018-04-02 RX ADMIN — ENOXAPARIN SODIUM 30 MG: 30 INJECTION SUBCUTANEOUS at 08:58

## 2018-04-02 RX ADMIN — BACITRACIN ZINC 1 LARGE APPLICATION: 500 OINTMENT TOPICAL at 20:21

## 2018-04-02 RX ADMIN — BACITRACIN ZINC 1 LARGE APPLICATION: 500 OINTMENT TOPICAL at 08:58

## 2018-04-02 NOTE — SOCIAL WORK
CM obtained auth pt's from insurance company in order for pt to d/c to [id number] # [id number]  Good for 5 days  Next Review on 4/6/18  CM will discuss with crisis for when bed is available

## 2018-04-02 NOTE — PROGRESS NOTES
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
## 2018-04-02 NOTE — PLAN OF CARE
Alteration in Thoughts and Perception     Treatment Goal: Gain control of psychotic behaviors/thinking, reduce/eliminate presenting symptoms and demonstrate improved reality functioning upon discharge Not Progressing     Refrain from acting on delusional thinking/internal stimuli Not Progressing     Recognize dysfunctional thoughts, communicate reality-based thoughts at the time of discharge Not Progressing          Alteration in Thoughts and Perception     Verbalize thoughts and feelings Progressing     Agree to be compliant with medication regime, as prescribed and report medication side effects Progressing     Attend and participate in unit activities, including therapeutic, recreational, and educational groups Progressing     Complete daily ADLs, including personal hygiene independently, as able Progressing        Depression     Treatment Goal: Demonstrate behavioral control of depressive symptoms, verbalize feelings of improved mood/affect, and adopt new coping skills prior to discharge Progressing     Refrain from harming self Progressing     Refrain from isolation Progressing        Prexisting or High Potential for Compromised Skin Integrity     Skin integrity is maintained or improved Progressing

## 2018-04-03 PROCEDURE — 99222 1ST HOSP IP/OBS MODERATE 55: CPT | Performed by: PSYCHIATRY & NEUROLOGY

## 2018-04-03 RX ORDER — OLANZAPINE 5 MG/1
5 TABLET ORAL
Status: DISCONTINUED | OUTPATIENT
Start: 2018-04-03 | End: 2018-04-05

## 2018-04-03 RX ADMIN — BACITRACIN ZINC 1 LARGE APPLICATION: 500 OINTMENT TOPICAL at 17:15

## 2018-04-03 RX ADMIN — BACITRACIN ZINC 1 LARGE APPLICATION: 500 OINTMENT TOPICAL at 08:15

## 2018-04-03 NOTE — PLAN OF CARE
Problem: Ineffective Coping  Goal: Participates in unit activities  Interventions:  - Provide therapeutic environment   - Provide required programming   - Redirect inappropriate behaviors    Outcome: Progressing  Pt cooperative in self assessment interview in the dayroom  She had been spontaneously conversing with another female peer about gardening and was appreciative for the conversation  Pt expressed that God will tell her when she is ready to leave the hospital and that she likes the fact that she has been "redeemed " She was unable to state any stressors in her life and is proud of being a Theta Healer  Pt is beginning to take an interest in self care,plans to shower today and care for her hair(hair currently tangled)  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

## 2018-04-03 NOTE — PROGRESS NOTES
Pt calm, cooperative, and continues to be med compliant  Pt denies SI at current  Pt continues to respond to internal stimuli  She reports 'they are talking to me  They tell me what I should and shouldn't discuss ' There were many brief pauses while pt talking to staff  Pt denies that her injuries were ever done with the intent to hurt herself  She states 'I did what I did as part of my atonement  I was fasting for 6 days and this atonement was a process, but it's over now  I broke my toes as part of it as well  That's how it started  They wanted me to break my spine, but I just couldn't do that, but they're okay with it  They understood  I am recovering now  It is all over ' 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

## 2018-04-03 NOTE — CASE MANAGEMENT
CM contacted [address] [place] [address] regarding Pt's 302 dated 3/31/18 and expiring 4/5/18 and [address] [place] [address] [facility] states 1 day extension cannot be granted  CM informed Dr Raciel Abbasi of same and new 2 physician [phone] initiated today  CM met with Pt and reviewed both 302 and 303 commitment process and reviewed 302 and 303 rights with Pt and provided Pt with copy of these rights  CM faxed [phone] and 303 commitment paperwork with [employer] (302/303) to [address] [place] [address] and 303 hearing scheduled for 4/6/18 @ 8 AM via teleconference  Pt informed of 303 date/tiime and that she will have the opportunity to speak with  prior to 303 hearing  Pt states that she is not agreeable to psychotropic medication at this time and refused to sign Tx plan  Pt states she will be in contact with her friends (Nila Draper and Aida) and refused to sign ADRIAN for any natural supports  Pt does not have PCP and uses urgent care for any medical issues or concerns  Pt denies history of drug/alcohol abuse/use and has no prior psych history  Pt states that she feels safe on the unit and denies SI or thoughts/commands to harm herself at this time  Pt states, "I am done with my atonement now- it came over me today and I feel more peaceful now than ever in my life "  Pt states that she has had seen visions and heard voices of [employer] icons ("Markus, God, Mother Jaimee Saravia") and can continue to do so, but denies hearing voices or seeing visions at this time and denies any other  /VH

## 2018-04-03 NOTE — PROGRESS NOTES
Pt is alert, cooperative with care but very pre-occupied with the auditory commands this shift  She denies all s/s when asked but does admit to hearing god, mother Deng Correa, and Liseth Brush talk to her and they give her commands/instructions to follow  She did not give details about the instructions but she did admit to following whatever they tell her and she is in atonement and is making up for sins from her past lives  Pt was seen sitting in her room staring and was found standing by the window in the day room staring for long periods of time  When questioned she said she is meditating and the voices are telling her how to heal herself and others  She was present out in the milieu most of the shift but not social among peers  She did approach me and asked if her friend Miguel A Jessy left her cell phone when she brought the chapstick because she needs her phone to contact people in order to process payroll by Friday  I told her she could call Miguel A Jiménez but never did so this shift  I asked her when these voices started and first she said 1-2 weeks but then as the conversation went on she stated the voices told her back in February that she should start changing her diet to vegan since no animal should ever suffer and the voices want her dog to work along side her to heal people and make up for the sins from past lives  Will continue to monitor

## 2018-04-03 NOTE — PLAN OF CARE
Problem: DISCHARGE PLANNING  Goal: Discharge to home or other facility with appropriate resources  INTERVENTIONS:  - Identify barriers to discharge w/patient and caregiver  - Arrange for needed discharge resources and transportation as appropriate  - Identify discharge learning needs (meds, wound care, etc )  - Arrange for interpretive services to assist at discharge as needed  - Refer to Case Management Department for coordinating discharge planning if the patient needs post-hospital services based on physician/advanced practitioner order or complex needs related to functional status, cognitive ability, or social support system   Outcome: Not Progressing  CM completed SW intake assessment and reviewed Tx plan with Pt and ADRIAN and Pt is refusing to sign Tx and ADRIAN at this time
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
## 2018-04-04 PROCEDURE — 99232 SBSQ HOSP IP/OBS MODERATE 35: CPT | Performed by: PSYCHIATRY & NEUROLOGY

## 2018-04-04 RX ADMIN — BACITRACIN ZINC 1 LARGE APPLICATION: 500 OINTMENT TOPICAL at 18:39

## 2018-04-04 RX ADMIN — BACITRACIN ZINC 1 LARGE APPLICATION: 500 OINTMENT TOPICAL at 08:37

## 2018-04-04 NOTE — DISCHARGE INSTR - OTHER ORDERS
[facility] Department of    Mental Health/Developmental Programs Marni Hutton [address] [facility]  [address] [place] [address] Martha Piedra Services                     [phone]  TDD Line                                           [phone]    ADVOCACY/PEER SUPPORT FOR [address] Martha Murray                               [phone]  Consumer/Family Satisfaction Team    The [address]    [phone]    Disability Rights [place]                             [phone]                                                    [TDD] [phone]                                [address] [facility]  Brennan Rubio [address]  [phone]    The following agencies may provide services to people with no insurance and those with Medical Assistance and Insurance:  [address]         [address] [facility]               [phone]  The [facility]               [phone]  Family Service Association [facility]   [address]  [facility]           [phone]      D&A SELF-HELP SUPPORT GROUPS    Al-[place]  Families of people with addiction       [phone] or [phone]  Alcoholics Anonymous                                           [phone] or [phone]  Cocaine Anonymous                                   [phone]  Codependence Anonymous                           [phone]  Double Trouble Groups ([facility])  - [address] [facility] [address]                   [phone]  Narcotics Anonymous                                 [phone]    [address] [facility] Dept  of Mental Health/Developmental Programs                                                        [phone]  Information and referrals for case management, residential, and employment services  The following agencies provide services to people with no Insurance and those with [place]:  Shellie                             [phone]  [facility]                                    [phone]  [facility] Case Management)  [facility] SITE                       [address]                                     [phone]    The following agencies provide services and supports to people with Medical Assistance and Insurance:  Family Service Association of [facility]* [address] [facility] [address]*                                          [phone]  *Romanian speaking staff available      [address]                          [phone]  Smooth [place]                                   [phone]  press 1  Department of [facility]  [phone] [address] [place] [address] 24-hour Crisis Intervention Services [phone]              [address] [place] [address] Blvd:                [address] [facility] Bl[address]:                  [phone]                 - [place] Line:                              [address]:                  [phone]    [facility]s Crisis Support       [phone]  National Suicide Prevention Lifeline      5-552.669.6295  Tahmina Long [address] after Hours Emergency #            [phone]  press [facility] [place]      [phone]  [address]  (Network of Victim Assistance)  [address] [facility] [address]                      [phone]  24hr  Domestic [place] on Aging Jeimy Yusuf Abuse #) [phone]    SELF-HELP/FAMILY [address]                             [phone]  Peer support, education & socialization in [address] [place] [address]  [phone]  [facility]  Peer Support services that are designed to promote recovery and resiliency  The [place] of [facility] [address]     [phone]  Free Family Education Programs to help family members of individuals with an active addiction  [employer]  Ctr      [phone]  [address] [place] [address]  Ctr   [phone]    [facility] Program    [phone]  [place] SOLDIERS & SAILORS [facility] Consumers Association           [phone]  BC of [facility]                          [phone]  ([facility] on [address] [facility])  Provides individual & family support, education & advocacy  CONTACT Helpline (a Warm Line)          [address] [facility]               [address]             [address]               [address] (DESMOND) [address]                    www desmondbucks  800 [facility], pantry, utility aid & self-sufficiency programs  - [facility]                           [phone]                 - [address]  - [facility]    Department of Public Welfare [phone]  Programs include [employer], [facility]              [phone]    [facility] INFORMATION    [facility] Transport                        [phone]  Applications and appointment scheduling for [employer], Persons with Disabilities Program, Adriana Gonzales, and [employer] [place] [employer]                  [phone]  [facility] FOR W[facility]EN & [facility]ABIES Trains                           [phone]  SEPTA TDD Line                                     [address] [facility]                     [phone]  For individuals who are homeless or experiencing a housing-related crisis  [address]                                 [phone]                 - [address] [place] [address]  Public housing for people with disabilities  Martha Bonner [address] Association (D&A):  Tomi at  [employer]  Projects for Assistance in Transition from Homelessness (SZUG)                                      [phone] [address]                     [phone]                           [facility]                     [address] [facility]               [phone]  [employer] XTHSW            [address] [facility] [address]                [address] [facility]     of [facility]                                    [phone]                 [facility]                                [facility]                              [address] [facility] Kevin Montes               [phone]  Protection from [address] [facility] [address]

## 2018-04-04 NOTE — PLAN OF CARE
Problem: Ineffective Coping  Goal: Participates in unit activities  Interventions:  - Provide therapeutic environment   - Provide required programming   - Redirect inappropriate behaviors    Outcome: Not Progressing  Pt isolating in her room except for meals  She politely refused all groups today  She did come out of her room at lunch yet displayed poor eye contact and told her peers that she was meditating  Pt states that she will be ready to attend groups on day but not yet

## 2018-04-04 NOTE — PROGRESS NOTES
Progress Note - Behavioral Health   Renae Stephenson 63 y o  female MRN: @MRN   Unit/Bed#: NO5 584-00 Encounter: [id number]        SECOND OPINION FOR INJECTABLE PSYCHIATRIC MEDICATIONS    Renae Stephenson 63 y o  female MRN: [id number]  Unit/Bed#: IP3 569-01 Encounter: [id number]      Reason for Admission/Current Symptoms:    Anne-Marie Rabago is a 63 y o  female with a history of bipolar disorder versus schizoaffective disorder who was admitted to the inpatient psychiatric unit on a involuntary 302 commitment basis due to manic symptoms, mixed symptoms of bipolar disorder, signs of acute psychosis, psychotic symptoms, auditory hallucinations, delusional thoughts, bizarre behavior, inability to care for self because of mental illness and severe self mutilation as a response to patient psychotic delusions with command auditory hallucinations    Symptoms prior to admission included self-abusive behavior, self-abusive thoughts, mood swings, racing thoughts, delusional thoughts, delusional thinking with grandiose delusions and Yazidism preoccupation  Chayo Petersen was admitted to [facility] adult Psychiatry Unit to treat her manic psychosis   Second opinion for injectable psychotropic medications was requested by Dr Brigid Puri due to Birdie's refusal to take oral medications  On evaluation by the writer yesterday afternoon Chayo Petersen was [facility] psychotic stating that she cut off her left hand ring finder in order to push herself so she would not be able to wear  a wedding ring even if she get   She also tried to poke her eyes and used a hammer to break her toes as a response to auditory hallucinations of "God and Divine Power", telling her to perform "atonement" to address sins of her previous life, shown to her by "God"   Being terrified by what she saw "doing in her previous life",  being in  psychotic likely oneiroid state while "receiving the information from God" , the patient decided to follow on with the "four voices in one voice of God" advice to "cleanse her" by punishing herself  As a result, she had toes fractures and a loss of one finger on her left hand  Pt continues to express her grandious delusional beliefs of her unsual abilities to heal people at the distcance and communate with Brennon Graciela  In addition, pt stated that her body is "moved by Mother of God and Starlinoe Delvin" in different directions,  A symptom of psychosis , consistent with delusion of influence  Being in deep psychotic state the patient has nil insight into her psychiatric condition  Mental Status Evaluation:    Appearance:  age appropriate, dressed in hospital attire, missing one finger onher left hand   Behavior:  pleasant, bizarre   Speech:  increased rate, pressured, hypertalkative   Mood:  anxious, euphoric, manic   Affect:  labile, overbright, inappropriate, increased in intensity, increased in range   Thought Process:  illogical, racing of thoughts   Associations: tangential associations   Thought Content:  grandiose, bizarre, somatic and influence delusions   Perceptual Disturbances: auditory hallucinations of Divine Power and Mother of God   Risk Potential: Suicidal ideation - None at present  Homicidal ideation - None  Potential for aggression - No   Sensorium:  oriented to person, place, time/date and situation   Memory:  recent and remote memory grossly intact   Consciousness:  alert and awake    Attention: decreased concentration   Insight:  impaired due to psychosis   Judgment: impaired due to psychosis   Gait/Station: in bed   Motor Activity: no abnormal movements       Vital signs in last 24 hours:    Temp:  [97 8 °F (36 6 °C)-99 3 °F (37 4 °C)] 97 8 °F (36 6 °C)  HR:  [65-77] 77  Resp:  [18] 18  BP: ()/(53-65) 106/65    Labs: I have personally reviewed all pertinent laboratory/tests results    Most Recent Labs:   Lab Results   Component Value Date    WBC 16 20 (H) 03/30/2018    RBC 3 71 (L) 03/30/2018    HGB 11 7 03/30/2018    HCT 32 8 (L) 03/30/2018     03/30/2018    RDW 12 1 03/30/2018    NEUTROABS 13 92 (H) 03/30/2018     (L) 03/30/2018    K 3 4 (L) 03/30/2018     03/30/2018    CO2 18 (L) 03/30/2018    BUN 20 03/30/2018    CREATININE 1 00 03/30/2018    GLUCOSE 176 (H) 03/30/2018    CALCIUM 8 6 03/30/2018       Medications: All current active medications have been reviewed  Current medications:    Current Facility-Administered Medications:  acetaminophen 650 mg Oral Q6H PRN Kalina Collar, MD   bacitracin 1 large application Topical BID SANDRA Pepper   haloperidol 5 mg Oral Q8H PRN Juluis Collar, MD   haloperidol lactate 5 mg Intramuscular Q8H PRN Juluis Collar, MD   ibuprofen 400 mg Oral Q8H PRN Juluis Collar, MD   LORazepam 1 mg Intramuscular Q6H PRN Kalina Collar, MD   LORazepam 1 mg Oral Q6H PRN Juluis Collar, MD   OLANZapine 10 mg Intramuscular Q8H PRN Juluis Collar, MD   OLANZapine 5 mg Oral HS Juluis Collar, MD   risperiDONE 1 mg Oral Q8H PRN Juluis Collar, MD   traMADol 50 mg Oral Q6H PRN Juluis Collar, MD   zolpidem 5 mg Oral HS PRN Kalina Bustamante, MD       Assessment/Plan   Principal Problem:    Acute psychosis      Recommended Treatment:     I agree with the need for injectable antipsychotic medications if the she refuses oral medications  At this time she is acutely psychotic and does not have a capacity to make decisions regarding refusal of necessary psychiatric treatment  Without treatment she poses a danger to self due to inability to care for her basic needs  she refuses treatment and does not have understanding of risks of treatment refusal or benefits of treatment at this time    she is not likely to improve without medications and will require administration of injectable medications against her will to assure safety of self and others   Dangerousness of severe life threatening self mutilation persist because of psychotic state with poor insight  ** Please Note: This note has been constructed using a voice recognition system   **

## 2018-04-04 NOTE — PROGRESS NOTES
Pt cooperative with care  Out for meals and social upon approach  Denies SI currently  When questioned about her injuries she continues to state her "atonement is over" and denies intention of hurting herself  Did not attend evening group  Will continue to monitor closely

## 2018-04-04 NOTE — PLAN OF CARE
Problem: DISCHARGE PLANNING  Goal: Discharge to home or other facility with appropriate resources  INTERVENTIONS:  - Identify barriers to discharge w/patient and caregiver  - Arrange for needed discharge resources and transportation as appropriate  - Identify discharge learning needs (meds, wound care, etc )  - Arrange for interpretive services to assist at discharge as needed  - Refer to Case Management Department for coordinating discharge planning if the patient needs post-hospital services based on physician/advanced practitioner order or complex needs related to functional status, cognitive ability, or social support system   Outcome: Not Progressing  Pt refuses to sign Tx plan or any ADRIAN's at this time

## 2018-04-04 NOTE — CASE MANAGEMENT
CM contacted Nilo Villatoro @ [facility] [address] (Delegate) and Miryam Rao approved 302/303 to be held by [address] [place] [address] 4/6/18 with [facility] [address] being billed by [facility] Dariel PEREA to hold hearing and requested that completed 302/303 be faxed to [facility] by [address]  CM contacted Scott Fontanez @ [address] [place] [address] [address] and informed Jessica Grider of above and provided Daniela with [facility] [address] fax [phone] in order to fax completed 302/303 and Jessica Grider states she will do the same  Copy of 302 and 303 commitments sent for scan into EHR  CM met with Pt and Pt continues to deny any necessity to be hospitalized, pleasant but with delayed responses in conversation and presents preoccupied and states, "today I am in meditation with God, he is going thru my past lives and experiences with me and communicating with me but I am done with my atonement " Pt denies SI and denies command messages to harm self or others  Pt continues to refuse to sign Tx plan but CM provided Pt with copy of same  Pt continues to refuse to sign ADRIAN but CM received call from Pt's friend and co-worker (Angela Bright) requesting to speak directly with Pt regarding payroll issues at [employer] in [address] [place] [address])  CM gave phone to Pt and Pt spoke directly with 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 informed this writer that she has contacted an  to determine how she [place] [employer]) should proceed with handling Pt's business affairs while Pt is hospitalized and [facility] states that she is refusing to go to Pt's home to retrieve any business documents stating, "I hear the place is a mess because of what she did to herself before she was admitted and I don't feel comfortable going in their on my own "

## 2018-04-04 NOTE — CASE MANAGEMENT
Notification of Discharge  This is a Notification of Discharge from our facility [address]  Please be advised that this patient has been discharge from our facility  Below you will find the admission and discharge date and time including the patients disposition  PRESENTATION DATE: 3/30/2018 11:00 AM  IP ADMISSION DATE: 3/30/18 1141  DISCHARGE DATE: 4/2/2018 12:29 PM  DISPOSITION: [address] [facility] [place], operated by [facility] in the [facility] by [address] for 2017  Network Utilization Review Department  Phone: [phone]; Fax [phone]  ATTENTION: The Network Utilization Review Department is now centralized for our 7 Facilities  Make a note that we have a new phone and fax numbers for our Department  Please call with any questions or concerns to [phone] and carefully follow the prompts so that you are directed to the right person  All voicemails are confidential  Fax any determinations, approvals, denials, and requests for initial or continue stay review clinical to [phone]  Due to HIGH CALL volume, it would be easier if you could please send faxed requests to expedite your requests and in part, help us provide discharge notifications faster

## 2018-04-04 NOTE — ED NOTES
John Beach from [facility] seeking disposition  Informed of admission to [address] [place] [address]

## 2018-04-04 NOTE — PROGRESS NOTES
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

## 2018-04-05 PROCEDURE — 99232 SBSQ HOSP IP/OBS MODERATE 35: CPT | Performed by: PSYCHIATRY & NEUROLOGY

## 2018-04-05 PROCEDURE — 99232 SBSQ HOSP IP/OBS MODERATE 35: CPT | Performed by: PHYSICIAN ASSISTANT

## 2018-04-05 RX ORDER — OLANZAPINE 5 MG/1
5 TABLET ORAL DAILY
Status: DISCONTINUED | OUTPATIENT
Start: 2018-04-05 | End: 2018-04-06

## 2018-04-05 RX ORDER — OLANZAPINE 10 MG/1
5 INJECTION, POWDER, LYOPHILIZED, FOR SOLUTION INTRAMUSCULAR DAILY
Status: DISCONTINUED | OUTPATIENT
Start: 2018-04-05 | End: 2018-04-06

## 2018-04-05 RX ORDER — OLANZAPINE 10 MG/1
5 INJECTION, POWDER, LYOPHILIZED, FOR SOLUTION INTRAMUSCULAR
Status: DISCONTINUED | OUTPATIENT
Start: 2018-04-05 | End: 2018-04-05

## 2018-04-05 RX ADMIN — OLANZAPINE 5 MG: 10 INJECTION, POWDER, FOR SOLUTION INTRAMUSCULAR at 14:13

## 2018-04-05 RX ADMIN — WATER 10 ML: 1 INJECTION INTRAMUSCULAR; INTRAVENOUS; SUBCUTANEOUS at 14:19

## 2018-04-05 NOTE — PLAN OF CARE
Problem: Ineffective Coping  Goal: Participates in unit activities  Interventions:  - Provide therapeutic environment   - Provide required programming   - Redirect inappropriate behaviors    Outcome: Not Progressing  Pt  refused all groups again today  Chcse to read in her room alone then sat in day room over noon meal and stared blankly  Pt reports that she was meditating  Pt needed several cues to repsond verbally to staff

## 2018-04-05 NOTE — PLAN OF CARE
Alteration in Thoughts and Perception     Treatment Goal: Gain control of psychotic behaviors/thinking, reduce/eliminate presenting symptoms and demonstrate improved reality functioning upon discharge Not Progressing     Refrain from acting on delusional thinking/internal stimuli Not Progressing     Agree to be compliant with medication regime, as prescribed and report medication side effects Not Progressing     Recognize dysfunctional thoughts, communicate reality-based thoughts at the time of discharge Not Progressing          Alteration in Thoughts and Perception     Verbalize thoughts and feelings Progressing     Attend and participate in unit activities, including therapeutic, recreational, and educational groups Progressing     Complete daily ADLs, including personal hygiene independently, as able Progressing        Depression     Treatment Goal: Demonstrate behavioral control of depressive symptoms, verbalize feelings of improved mood/affect, and adopt new coping skills prior to discharge Progressing     Refrain from harming self Progressing     Refrain from isolation Progressing        Prexisting or High Potential for Compromised Skin Integrity     Skin integrity is maintained or improved Progressing

## 2018-04-05 NOTE — PROGRESS NOTES
Pt reporting "wierd sensation" under bandage of L ring finger  Bandage was loosened and reapplied per pt request  Pain medication was offered and refused

## 2018-04-05 NOTE — PROGRESS NOTES
Pt noted to have striped the bed from all linens and was naked laying down on her bed  This nurse advised pt to put clothes on d/t male peers  Pt was non-verbal however pt was able to confirm she need time to mediate and then she will put clothes back on  This nurse gave her 10mins and provided privacy  Pt was compliant  Denies SI/HI/AH/VH   Monitoring

## 2018-04-05 NOTE — CASE MANAGEMENT
CM met with Pt and discussed 303 hearing scheduled for 4/6/18 @ 8 AM,  Tx plan and ADRIAN to communicate with her friends (Maricruz Llamas and Aida) both of whom she identifies as strong social supports and Pt continues to refuse to sign Tx plan or ADRIAN at this time  Pt states, "I do not believe I need medication and I don't think I need any sort of mental health treatment- there is nothing wrong with me, I feel fine " Pt states that she does not want to sign an ADRIAN for her friends "if there's anything they need to know or do for me, I will tell them " Pt states that she spoke with her friend/coworker Lummi (CREEK) [facility]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

## 2018-04-05 NOTE — PLAN OF CARE
Problem: DISCHARGE PLANNING  Goal: Discharge to home or other facility with appropriate resources  INTERVENTIONS:  - Identify barriers to discharge w/patient and caregiver  - Arrange for needed discharge resources and transportation as appropriate  - Identify discharge learning needs (meds, wound care, etc )  - Arrange for interpretive services to assist at discharge as needed  - Refer to Case Management Department for coordinating discharge planning if the patient needs post-hospital services based on physician/advanced practitioner order or complex needs related to functional status, cognitive ability, or social support system   Outcome: Not Progressing  Pt refusing to sign Tx plan, ADRIAN's and is not participating in treatment  303 hearing to be held 4/6/18 @ 8 AM with [place] [address] via teleconference

## 2018-04-05 NOTE — PROGRESS NOTES
Orthopaedic team was called for follow up examination of left ring finger amputation  Patient does not want to be examined today  Please call back in the next week for re-examination of finger and removal of sutures when patient agreeable to examination  Please call with any additional questions as well

## 2018-04-05 NOTE — PROGRESS NOTES
Progress Note - Behavioral Health   Jeannette Stephenson 63 y o  female MRN: [id number]  Unit/Bed#: CK5 031-85 Encounter: [id number]    Patient seen, chart reviewed, discussed with staff  Nursing staff notes that the patient was isolative to self for much of the day yesterday  The patient only left her room for meals  Patient refused her p o  Zyprexa last night  No aggressive or agitated behavior  On approach this morning the patient is guarded with her response and vague  She states that she was in meditation with God yesterday  She remains bizarre and delusional with [employer] preoccupations  She talked about her  six-day fast without eating or drinking anything prior to coming to the hospital   She states that she has been trying to eat small amounts of fruit so she does not overwhelm her system  She denies any anxiety or depression  She denies any suicidal or homicidal ideation  She denies her self-harm being of suicide attempts  Physically she denies any complaints or concerns  She is superficial in her response to questions and vague  Patient lacks insight into her recent self-harm behavior and reason for hospitalization      Behavior over the last 24 hours:  unchanged  Sleep: insomnia  Appetite: poor  Medication side effects: No  ROS: no complaints  /63 (BP Location: Right arm)   Pulse 78   Temp 98 7 °F (37 1 °C) (Tympanic)   Resp 18   Ht 5' 8" (1 727 m)   Wt 88 kg (194 lb 0 1 oz)   SpO2 96%   BMI 29 50 kg/m²     Medications:   Current Facility-Administered Medications   Medication Dose Route Frequency    acetaminophen (TYLENOL) tablet 650 mg  650 mg Oral Q6H PRN    bacitracin topical ointment 1 large application  1 large application Topical BID    haloperidol (HALDOL) tablet 5 mg  5 mg Oral Q8H PRN    haloperidol lactate (HALDOL) injection 5 mg  5 mg Intramuscular Q8H PRN    ibuprofen (MOTRIN) tablet 400 mg  400 mg Oral Q8H PRN    LORazepam (ATIVAN) 2 mg/mL injection 1 mg 1 mg Intramuscular Q6H PRN    LORazepam (ATIVAN) tablet 1 mg  1 mg Oral Q6H PRN    OLANZapine (ZyPREXA) IM injection 10 mg  10 mg Intramuscular Q8H PRN    OLANZapine (ZyPREXA) IM injection 5 mg  5 mg Intramuscular HS PRN    OLANZapine (ZyPREXA) tablet 5 mg  5 mg Oral HS    risperiDONE (RisperDAL M-TABS) dispersible tablet 1 mg  1 mg Oral Q8H PRN    traMADol (ULTRAM) tablet 50 mg  50 mg Oral Q6H PRN    zolpidem (AMBIEN) tablet 5 mg  5 mg Oral HS PRN       Labs:   No visits with results within 1 Day(s) from this visit     Latest known visit with results is:   Admission on 03/30/2018, Discharged on 04/02/2018   Component Date Value Ref Range Status    ph, [facility] ISTAT 03/30/2018 7 486* 7 300 - 7 400 Final    pCO2, [facility] i-STAT 03/30/2018 22 6* 42 0 - 50 0 mm HG Final    pO2, [facility] i-STAT 03/30/2018 40 0  35 0 - 45 0 mm HG Final    BE, i-STAT 03/30/2018 -5* -2 - 3 mmol/L Final    HCO3, [facility] i-STAT 03/30/2018 17 0* 24 0 - 30 0 mmol/L Final    CO2, i-STAT 03/30/2018 18* 21 - 32 mmol/L Final    O2 Sat, i-STAT 03/30/2018 80* 95 - 98 % Final    SODIUM, I-STAT 03/30/2018 131* 136 - 145 mmol/l Final    Potassium, i-STAT 03/30/2018 3 8  3 5 - 5 3 mmol/L Final    Calcium, Ionized i-STAT 03/30/2018 1 08* 1 12 - 1 32 mmol/L Final    Hct, i-STAT 03/30/2018 32* 34 8 - 46 1 % Final    Hgb, i-STAT 03/30/2018 10 9* 11 5 - 15 4 g/dl Final    Glucose, i-STAT 03/30/2018 186* 65 - 140 mg/dl Final    Specimen Type 03/30/2018 VENOUS   Final    WBC 03/30/2018 16 20* 4 31 - 10 16 Thousand/uL Final    RBC 03/30/2018 3 71* 3 81 - 5 12 Million/uL Final    Hemoglobin 03/30/2018 11 7  11 5 - 15 4 g/dL Final    Hematocrit 03/30/2018 32 8* 34 8 - 46 1 % Final    MCV 03/30/2018 88  82 - 98 fL Final    MCH 03/30/2018 31 5  26 8 - 34 3 pg Final    MCHC 03/30/2018 35 7  31 4 - 37 4 g/dL Final    RDW 03/30/2018 12 1  11 6 - 15 1 % Final    MPV 03/30/2018 8 8* 8 9 - 12 7 fL Final    Platelets 63/19/6883 297  149 - 390 Thousands/uL Final    nRBC 03/30/2018 0  /100 WBCs Final    Neutrophils Relative 03/30/2018 86* 43 - 75 % Final    Lymphocytes Relative 03/30/2018 6* 14 - 44 % Final    Monocytes Relative 03/30/2018 8  4 - 12 % Final    Eosinophils Relative 03/30/2018 0  0 - 6 % Final    Basophils Relative 03/30/2018 0  0 - 1 % Final    Neutrophils Absolute 03/30/2018 13 92* 1 85 - 7 62 Thousands/µL Final    Lymphocytes Absolute 03/30/2018 0 93  0 60 - 4 47 Thousands/µL Final    Monocytes Absolute 03/30/2018 1 22  0 17 - 1 22 Thousand/µL Final    Eosinophils Absolute 03/30/2018 0 01  0 00 - 0 61 Thousand/µL Final    Basophils Absolute 03/30/2018 0 01  0 00 - 0 10 Thousands/µL Final    Sodium 03/30/2018 132* 136 - 145 mmol/L Final    Potassium 03/30/2018 3 4* 3 5 - 5 3 mmol/L Final    Chloride 03/30/2018 100  100 - 108 mmol/L Final    CO2 03/30/2018 18* 21 - 32 mmol/L Final    Anion Gap 03/30/2018 14* 4 - 13 mmol/L Final    BUN 03/30/2018 20  5 - 25 mg/dL Final    Creatinine 03/30/2018 1 00  0 60 - 1 30 mg/dL Final    Glucose 03/30/2018 176* 65 - 140 mg/dL Final    Calcium 03/30/2018 8 6  8 3 - 10 1 mg/dL Final    eGFR 03/30/2018 63  ml/min/1 73sq m Final    Protime 03/30/2018 16 3* 12 1 - 14 4 seconds Final    INR 03/30/2018 1 30* 0 86 - 1 16 Final    PTT 03/30/2018 31  23 - 35 seconds Final    ABO Grouping 03/30/2018 O   Final    Rh Factor 03/30/2018 Positive   Final    Antibody Screen 03/30/2018 Negative   Final    Specimen Expiration Date 03/30/2018 [id number]   Final    Amph/Meth UR 03/30/2018 Negative  Negative Final    Barbiturate Ur 03/30/2018 Negative  Negative Final    Benzodiazepine Urine 03/30/2018 Negative  Negative Final    Cocaine Urine 03/30/2018 Negative  Negative Final    Methadone Urine 03/30/2018 Negative  Negative Final    Opiate Urine 03/30/2018 Negative  Negative Final    PCP Ur 03/30/2018 Negative  Negative Final    THC Urine 03/30/2018 Negative  Negative Final    Ethanol Lvl 03/30/2018 <3  0 - 3 mg/dL Final    Salicylate Lvl 27/03/7165 <3* 3 - 20 mg/dL Final    Acetaminophen Level 03/30/2018 <2* 10 - 30 ug/mL Final    Case Report 03/30/2018    Final                    Value:Surgical Pathology Report                         Case: [id number]                                   Authorizing Provider:  Bee Jason PA-C    Collected:           03/30/2018 1434              Ordering Location:     [address] [place] [address]      Received:            03/30/2018 [facility]                                                                             Unit                                                                         Pathologist:           Dima Hart MD                                                         Specimen:    Finger, Left                                                                               Final Diagnosis 03/30/2018    Final                    Value: This result contains rich text formatting which cannot be displayed here   Additional Information 03/30/2018    Final                    Value: This result contains rich text formatting which cannot be displayed here  Mi Rein Description 03/30/2018    Final                    Value: This result contains rich text formatting which cannot be displayed here      Platelets 50/37/9411 208  149 - 390 Thousands/uL Final    MPV 03/30/2018 9 4  8 9 - 12 7 fL Final    Ventricular Rate 03/31/2018 88  BPM Final    Atrial Rate 03/31/2018 88  BPM Final    CO Interval 03/31/2018 150  ms Final    QRSD Interval 03/31/2018 79  ms Final    QT Interval 03/31/2018 375  ms Final    QTC Interval 03/31/2018 454  ms Final    P Axis 03/31/2018 46  degrees Final    QRS Axis 03/31/2018 20  degrees Final    T Wave [place] 03/31/2018 26  degrees Final    Ventricular Rate 03/30/2018 67  BPM Final    Atrial Rate 03/30/2018 67  BPM Final    CO Interval 03/30/2018 138  ms Final    QRSD Interval 03/30/2018 75  ms Final    QT Interval 03/30/2018 400  ms Final    QTC Interval 03/30/2018 423  ms Final    P Axis 03/30/2018 56  degrees Final    QRS Axis 03/30/2018 46  degrees Final    T Wave Axis 03/30/2018 44  degrees Final       Mental Status Evaluation:  Appearance:  age appropriate, disheveled and Dressing in place on left hand   Behavior:  evasive, guarded and Bizarre   Speech:  normal pitch and normal volume   Mood:  euphoric   Affect:  constricted   Thought Process:  illogical   Thought Content:  delusions  grandiose, somatic and Bizarre   Perceptual Disturbances: Auditory hallucinations of Divine power   Risk Potential: Suicidal Ideations none and Homicidal Ideations none   Sensorium:  person, place and time/date   Cognition:  grossly intact   Consciousness:  alert and awake    Attention: attention span appeared shorter than expected for age   Insight:  impaired due to Psychosis   Judgment: impaired due to Psychosis   Gait/Station: normal gait/station   Motor Activity: no abnormal movements     Progress Toward Goals:  No improvement    Assessment/Plan   Principal Problem:    Acute psychosis      Recommended Treatment:   Second opinion to force medications was completed yesterday by Dr Adore Garcia  Olanzapine 5 mg IM for refusal of p  o  at HS  Continue to encourage in milieu participations and group therapies  Continue with group therapy, milieu therapy and occupational therapy  Risks, benefits and possible side effects of Medications:   Patient does not verbalize understanding at this time and will require further explanation  Counseling / Coordination of Care  Total floor / unit time spent today 25 minutes  Greater than 50% of total time was spent with the patient and / or family counseling and / or coordination of care  A description of the counseling / coordination of care:  Medication management, chart review, patient interview

## 2018-04-05 NOTE — PROGRESS NOTES
Pt remained in her room this shift  OOB for meals only and retreated back to room straight after  Pt calm and pleasant  Pt refused to participate in group this evening and refused her 2200 medication  Pt currently denies any SI/HI  Will continue to monitor

## 2018-04-06 PROCEDURE — 99232 SBSQ HOSP IP/OBS MODERATE 35: CPT | Performed by: PSYCHIATRY & NEUROLOGY

## 2018-04-06 RX ORDER — OLANZAPINE 10 MG/1
10 TABLET ORAL DAILY
Status: DISCONTINUED | OUTPATIENT
Start: 2018-04-07 | End: 2018-04-09

## 2018-04-06 RX ORDER — OLANZAPINE 10 MG/1
10 INJECTION, POWDER, LYOPHILIZED, FOR SOLUTION INTRAMUSCULAR DAILY
Status: DISCONTINUED | OUTPATIENT
Start: 2018-04-07 | End: 2018-04-09

## 2018-04-06 RX ADMIN — OLANZAPINE 5 MG: 10 INJECTION, POWDER, FOR SOLUTION INTRAMUSCULAR at 09:06

## 2018-04-06 RX ADMIN — WATER 10 ML: 1 INJECTION INTRAMUSCULAR; INTRAVENOUS; SUBCUTANEOUS at 09:06

## 2018-04-06 RX ADMIN — BACITRACIN ZINC 1 LARGE APPLICATION: 500 OINTMENT TOPICAL at 08:37

## 2018-04-06 NOTE — PLAN OF CARE
Problem: Ineffective Coping  Goal: Participates in unit activities  Interventions:  - Provide therapeutic environment   - Provide required programming   - Redirect inappropriate behaviors    Outcome: Progressing  Pt did remain in the day room for morning group,but sat with eyes closed and reported that she was speaking with God  Pt refused to attend other sessions today

## 2018-04-06 NOTE — PLAN OF CARE
Problem: DISCHARGE PLANNING  Goal: Discharge to home or other facility with appropriate resources  INTERVENTIONS:  - Identify barriers to discharge w/patient and caregiver  - Arrange for needed discharge resources and transportation as appropriate  - Identify discharge learning needs (meds, wound care, etc )  - Arrange for interpretive services to assist at discharge as needed  - Refer to Case Management Department for coordinating discharge planning if the patient needs post-hospital services based on physician/advanced practitioner order or complex needs related to functional status, cognitive ability, or social support system   Outcome: Not Progressing  Discharge barrier: Pt refusing to sign Tx plan, ADRIAN's and is not participating in treatment, meds being administered over objection

## 2018-04-06 NOTE — CASE MANAGEMENT
303 hearing held today @ 8:20 AM with [facility], [place] [address] via teleconference and Pt in attendance and provided self-testimony and requested to be discharged; case was reviewed and CM was notified by [facility] [place] [address] that 303 was upheld and Pt is now on 303 for up to 20 days and expires 4/25/18  CM informed Pt of 303 outcome and up to 20 day inpatient commitment and provided copy of 303 to Pt  Copy of completed 303 received from [facility] [place] sent for scan to EHR  CM attempted to review Tx plan with Pt and Pt with bizarre affect and delayed responses stating, "I am in meditation today " Pt continues to refuse to sign Tx plan or ADRIAN and is refusing medication and recommended labs and required meds over objection 4/5/18 and 4/6/18

## 2018-04-06 NOTE — PROGRESS NOTES
Pt remained seclusive to her room  Guarded in conversation  Kept stating she didn't want to talk about things right now  Refused to go to groups  States she wants privacy because she is communicating with God  Pt is responding to internal stimuli  Denies VH and SI  Left wrist ace wrap is clean and intact  Visible in milieu for meals only  Will continue to monitor

## 2018-04-06 NOTE — PROGRESS NOTES
Pt refused po med this a m  States she knows her rights and this is her choice  Several attempts made with different staff  She continues to refuse  Given IM zyprexa in Left deltoid  Advised pt to use alternate site  She refused and stated the left arm was just fine
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
## 2018-04-06 NOTE — PROGRESS NOTES
Patient seclusive to room tonight saying she needed to meditates  Norma Thomas is very scant and has minimal response on no response to questions; eyes closed during interaction  Left wrist splint/ace wrap intact  Fingers warm with good capillary refill  Denies SI/HI at this time

## 2018-04-07 PROCEDURE — 99232 SBSQ HOSP IP/OBS MODERATE 35: CPT | Performed by: PSYCHIATRY & NEUROLOGY

## 2018-04-07 RX ADMIN — OLANZAPINE 10 MG: 10 INJECTION, POWDER, FOR SOLUTION INTRAMUSCULAR at 08:51

## 2018-04-07 RX ADMIN — BACITRACIN ZINC 1 LARGE APPLICATION: 500 OINTMENT TOPICAL at 09:07

## 2018-04-07 NOTE — PROGRESS NOTES
Pt visible in the small day room this evening  Pt  continues to mediate throughout this evening  Pt provides minimal eye contact when spoken to  Denies SI  No c/o pain/discomfort reported this shift  Will continue to monitor
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
## 2018-04-07 NOTE — PLAN OF CARE
Alteration in Thoughts and Perception     Treatment Goal: Gain control of psychotic behaviors/thinking, reduce/eliminate presenting symptoms and demonstrate improved reality functioning upon discharge Progressing     Verbalize thoughts and feelings Progressing     Refrain from acting on delusional thinking/internal stimuli Progressing     Agree to be compliant with medication regime, as prescribed and report medication side effects Progressing     Attend and participate in unit activities, including therapeutic, recreational, and educational groups Progressing     Recognize dysfunctional thoughts, communicate reality-based thoughts at the time of discharge Progressing     Complete daily ADLs, including personal hygiene independently, as able Progressing        Depression     Treatment Goal: Demonstrate behavioral control of depressive symptoms, verbalize feelings of improved mood/affect, and adopt new coping skills prior to discharge Progressing     Refrain from harming self Progressing     Refrain from isolation Progressing        Ineffective Coping     Participates in unit activities Progressing        Prexisting or High Potential for Compromised Skin Integrity     Skin integrity is maintained or improved Progressing

## 2018-04-07 NOTE — PROGRESS NOTES
Pt refused to get out and breakfast   Pt states, "I will be meditation most of the morning/afternoon and will also not eat lunch"  Denies all symptoms, but during our conversation pt states that "meditation is when I talk with GOD, and he talks with me"  Pt was compliant with IM Zyprexa  Will continue to monitor

## 2018-04-08 PROCEDURE — 99232 SBSQ HOSP IP/OBS MODERATE 35: CPT | Performed by: PSYCHIATRY & NEUROLOGY

## 2018-04-08 RX ADMIN — OLANZAPINE 10 MG: 10 INJECTION, POWDER, FOR SOLUTION INTRAMUSCULAR at 09:17

## 2018-04-08 RX ADMIN — BACITRACIN ZINC 1 LARGE APPLICATION: 500 OINTMENT TOPICAL at 19:11

## 2018-04-08 NOTE — PROGRESS NOTES
Pt refused to get out of bed for breakfast, but did agree to come out for lunch with much encouragement  Pt refused PO zyprexa, stating that she is taking nothing by mouth  Im of zyprexa given in left deltoid  Tolerated well  Pt is currently in the dayroom after being seclusive this morning to her room, advising she is meditating  Pt denies auditory hallucinations  Will continue to monitor

## 2018-04-08 NOTE — PROGRESS NOTES
Pt seclusive to her room all evening  States she is meditating  Some irritability noted towards staff doing rounding as she states being upset by them opening the door to her room often  Pt has made no attempts to leave her room and has been sitting in the dark  Appears to be responding to internal stimuli  Will monitor

## 2018-04-08 NOTE — PLAN OF CARE
Alteration in Thoughts and Perception     Treatment Goal: Gain control of psychotic behaviors/thinking, reduce/eliminate presenting symptoms and demonstrate improved reality functioning upon discharge Progressing     Verbalize thoughts and feelings Progressing     Refrain from acting on delusional thinking/internal stimuli Progressing     Agree to be compliant with medication regime, as prescribed and report medication side effects Progressing     Attend and participate in unit activities, including therapeutic, recreational, and educational groups Progressing     Recognize dysfunctional thoughts, communicate reality-based thoughts at the time of discharge Progressing     Complete daily ADLs, including personal hygiene independently, as able Progressing        Depression     Treatment Goal: Demonstrate behavioral control of depressive symptoms, verbalize feelings of improved mood/affect, and adopt new coping skills prior to discharge Progressing     Refrain from harming self Progressing     Refrain from isolation Progressing        Prexisting or High Potential for Compromised Skin Integrity     Skin integrity is maintained or improved Progressing
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
## 2018-04-09 LAB
ALBUMIN SERPL BCP-MCNC: 3.1 G/DL (ref 3.5–5)
ALP SERPL-CCNC: 157 U/L (ref 46–116)
ALT SERPL W P-5'-P-CCNC: 48 U/L (ref 12–78)
ANION GAP SERPL CALCULATED.3IONS-SCNC: 10 MMOL/L (ref 4–13)
AST SERPL W P-5'-P-CCNC: 50 U/L (ref 5–45)
BASOPHILS # BLD AUTO: 0.02 THOUSANDS/ΜL (ref 0–0.1)
BASOPHILS NFR BLD AUTO: 0 % (ref 0–1)
BILIRUB SERPL-MCNC: 0.84 MG/DL (ref 0.2–1)
BUN SERPL-MCNC: 20 MG/DL (ref 5–25)
CALCIUM SERPL-MCNC: 8.7 MG/DL
CHLORIDE SERPL-SCNC: 109 MMOL/L (ref 100–108)
CO2 SERPL-SCNC: 20 MMOL/L (ref 21–32)
CREAT SERPL-MCNC: 0.55 MG/DL (ref 0.6–1.3)
EOSINOPHIL # BLD AUTO: 0.15 THOUSAND/ΜL (ref 0–0.61)
EOSINOPHIL NFR BLD AUTO: 2 % (ref 0–6)
ERYTHROCYTE [DISTWIDTH] IN BLOOD BY AUTOMATED COUNT: 13.6 % (ref 11.6–15.1)
GFR SERPL CREATININE-BSD FRML MDRD: 104 ML/MIN/1.73SQ M
GLUCOSE P FAST SERPL-MCNC: 86 MG/DL (ref 65–99)
GLUCOSE SERPL-MCNC: 86 MG/DL (ref 65–140)
HCT VFR BLD AUTO: 26.7 % (ref 34.8–46.1)
HGB BLD-MCNC: 9.2 G/DL (ref 11.5–15.4)
LYMPHOCYTES # BLD AUTO: 1.85 THOUSANDS/ΜL (ref 0.6–4.47)
LYMPHOCYTES NFR BLD AUTO: 22 % (ref 14–44)
MCH RBC QN AUTO: 31.3 PG (ref 26.8–34.3)
MCHC RBC AUTO-ENTMCNC: 34.5 G/DL (ref 31.4–37.4)
MCV RBC AUTO: 91 FL (ref 82–98)
MONOCYTES # BLD AUTO: 0.65 THOUSAND/ΜL (ref 0.17–1.22)
MONOCYTES NFR BLD AUTO: 8 % (ref 4–12)
NEUTROPHILS # BLD AUTO: 5.9 THOUSANDS/ΜL (ref 1.85–7.62)
NEUTS SEG NFR BLD AUTO: 68 % (ref 43–75)
NRBC BLD AUTO-RTO: 0 /100 WBCS
PLATELET # BLD AUTO: 286 THOUSANDS/UL (ref 149–390)
PMV BLD AUTO: 8.2 FL (ref 8.9–12.7)
POTASSIUM SERPL-SCNC: 3.7 MMOL/L (ref 3.5–5.3)
PROT SERPL-MCNC: 7.1 G/DL (ref 6.4–8.2)
RBC # BLD AUTO: 2.94 MILLION/UL (ref 3.81–5.12)
SODIUM SERPL-SCNC: 139 MMOL/L (ref 136–145)
TSH SERPL DL<=0.05 MIU/L-ACNC: 0.65 UIU/ML (ref 0.36–3.74)
WBC # BLD AUTO: 8.61 THOUSAND/UL (ref 4.31–10.16)

## 2018-04-09 PROCEDURE — 80053 COMPREHEN METABOLIC PANEL: CPT | Performed by: PHYSICIAN ASSISTANT

## 2018-04-09 PROCEDURE — 99232 SBSQ HOSP IP/OBS MODERATE 35: CPT | Performed by: PSYCHIATRY & NEUROLOGY

## 2018-04-09 PROCEDURE — 99233 SBSQ HOSP IP/OBS HIGH 50: CPT | Performed by: PHYSICIAN ASSISTANT

## 2018-04-09 PROCEDURE — 85025 COMPLETE CBC W/AUTO DIFF WBC: CPT | Performed by: PHYSICIAN ASSISTANT

## 2018-04-09 PROCEDURE — 84443 ASSAY THYROID STIM HORMONE: CPT | Performed by: PHYSICIAN ASSISTANT

## 2018-04-09 RX ORDER — OLANZAPINE 10 MG/1
10 INJECTION, POWDER, LYOPHILIZED, FOR SOLUTION INTRAMUSCULAR
Status: DISCONTINUED | OUTPATIENT
Start: 2018-04-09 | End: 2018-04-18

## 2018-04-09 RX ORDER — OLANZAPINE 10 MG/1
5 INJECTION, POWDER, LYOPHILIZED, FOR SOLUTION INTRAMUSCULAR DAILY
Status: DISCONTINUED | OUTPATIENT
Start: 2018-04-10 | End: 2018-04-12

## 2018-04-09 RX ORDER — OLANZAPINE 5 MG/1
5 TABLET ORAL DAILY
Status: DISCONTINUED | OUTPATIENT
Start: 2018-04-09 | End: 2018-04-09

## 2018-04-09 RX ORDER — OLANZAPINE 10 MG/1
10 TABLET ORAL
Status: DISCONTINUED | OUTPATIENT
Start: 2018-04-09 | End: 2018-04-18

## 2018-04-09 RX ORDER — OLANZAPINE 5 MG/1
5 TABLET ORAL DAILY
Status: DISCONTINUED | OUTPATIENT
Start: 2018-04-10 | End: 2018-04-12

## 2018-04-09 RX ADMIN — OLANZAPINE 10 MG: 10 INJECTION, POWDER, FOR SOLUTION INTRAMUSCULAR at 09:34

## 2018-04-09 RX ADMIN — OLANZAPINE 10 MG: 10 INJECTION, POWDER, FOR SOLUTION INTRAMUSCULAR at 22:07

## 2018-04-09 RX ADMIN — WATER 10 ML: 1 INJECTION INTRAMUSCULAR; INTRAVENOUS; SUBCUTANEOUS at 09:35

## 2018-04-09 NOTE — PLAN OF CARE
Alteration in Thoughts and Perception     Treatment Goal: Gain control of psychotic behaviors/thinking, reduce/eliminate presenting symptoms and demonstrate improved reality functioning upon discharge Not Progressing     Refrain from acting on delusional thinking/internal stimuli Not Progressing     Agree to be compliant with medication regime, as prescribed and report medication side effects Not Progressing     Attend and participate in unit activities, including therapeutic, recreational, and educational groups Not Progressing     Recognize dysfunctional thoughts, communicate reality-based thoughts at the time of discharge Not Progressing          Alteration in Thoughts and Perception     Verbalize thoughts and feelings Progressing     Complete daily ADLs, including personal hygiene independently, as able Progressing        Depression     Treatment Goal: Demonstrate behavioral control of depressive symptoms, verbalize feelings of improved mood/affect, and adopt new coping skills prior to discharge Progressing     Refrain from harming self Progressing     Refrain from isolation Progressing        Prexisting or High Potential for Compromised Skin Integrity     Skin integrity is maintained or improved Progressing

## 2018-04-09 NOTE — PLAN OF CARE
Problem: Ineffective Coping  Goal: Participates in unit activities  Interventions:  - Provide therapeutic environment   - Provide required programming   - Redirect inappropriate behaviors    Outcome: Not Progressing  Pt politely refused all groups today  She stood in [place] with eyes closed,stating that she was meditating and that the voices need to tell her if she can move left or right  Pt  At one point required staff cues to move self out of the way of other passers by

## 2018-04-09 NOTE — PROGRESS NOTES
Attempted to redirect pt back to her room or to sit down since she is refusing to open her eyes  With encouragement, pt ambulated back to her room, however, is still currently standing in the hallway outside of her room  Pt states 'when I feel the pain in my heart, god is communicating to me so I have to listen ' Will continue to monitor

## 2018-04-09 NOTE — PROGRESS NOTES
Pt withdrawn to room all evening-reports she is meditating and focusing on herself  Reports that she is not ready to relate to others at this point, but talks to God and to her friend/  Sits at desk,staring outdoors at times  Denies all s/s
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
## 2018-04-09 NOTE — PROGRESS NOTES
This AM, pt reported constipation, but refused any PRN medication  Pt sat on the toilet for at least a half hr  Staff reported pt had a BM  Pt observed ambulating through her room with her eyes closed  Asked pt why she was doing this  Pt states 'I am communicating with god  This is what he is asking me to do so I am blessing his wishes ' Asked pt if she wanted to come out for groups  Pt states 'I am meditating with god  Please do not disturb me ' Pt then observed ambulating from her room to the dayroom with her eyes closed  Pt refused to open them  When presenting pt with her AM medication, pt states 'I hope you have the shot  That's the only way I am taking it  I won't take anything by mouth  Maybe I will one day, but for now this is what god wants me to do ' Pt currently standing in the middle of the hallway with her eyes closed  Pt will not allow staff to assist her to her room and will not take redirection to open her eyes are step to one side  Will continue to monitor

## 2018-04-09 NOTE — PROGRESS NOTES
Spoke with patient in her room  Patient was pleasant but kept her eyes shut during entire conversation  She reported meditating and that she is unable to open her eyes until she finishes her meditation  Attempted to obtain ADRIAN's and she politely declined and asked if she could complete at a later date when her meditation was completed  This writer attempted to explain that signing releases will be helpful for discharge but patient still politely declined

## 2018-04-10 PROBLEM — M79.89 SWELLING OF RIGHT LOWER EXTREMITY: Status: ACTIVE | Noted: 2018-04-10

## 2018-04-10 PROCEDURE — 99252 IP/OBS CONSLTJ NEW/EST SF 35: CPT | Performed by: INTERNAL MEDICINE

## 2018-04-10 PROCEDURE — 99233 SBSQ HOSP IP/OBS HIGH 50: CPT | Performed by: PSYCHIATRY & NEUROLOGY

## 2018-04-10 RX ADMIN — OLANZAPINE 5 MG: 10 INJECTION, POWDER, FOR SOLUTION INTRAMUSCULAR at 09:39

## 2018-04-10 RX ADMIN — WATER 10 ML: 1 INJECTION INTRAMUSCULAR; INTRAVENOUS; SUBCUTANEOUS at 09:40

## 2018-04-10 RX ADMIN — OLANZAPINE 10 MG: 10 INJECTION, POWDER, FOR SOLUTION INTRAMUSCULAR at 22:06
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
## 2018-04-10 NOTE — PLAN OF CARE
Problem: DISCHARGE PLANNING  Goal: Discharge to home or other facility with appropriate resources  INTERVENTIONS:  - Identify barriers to discharge w/patient and caregiver  - Arrange for needed discharge resources and transportation as appropriate  - Identify discharge learning needs (meds, wound care, etc )  - Arrange for interpretive services to assist at discharge as needed  - Refer to Case Management Department for coordinating discharge planning if the patient needs post-hospital services based on physician/advanced practitioner order or complex needs related to functional status, cognitive ability, or social support system   Outcome: Not Progressing  Pt refusing medication, refusing to  participate in treatment, refusing to sign Tx plan or Release of Information  Pt is on 303 involuntary commitment (expires 4/25/18) and receiving medications over objection at this time

## 2018-04-10 NOTE — PROGRESS NOTES
Pt was in her room for my shift  She slept the majority of the time  She did deny all s/s when asked  Refused oral medication and was given IM form in left deltoid per her request   Offered foods and fluid since she didn't have supper but declined  Will continue to monitor

## 2018-04-10 NOTE — CASE MANAGEMENT
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  Inscooby Benítez states she will contact Pt via phone today and discuss her concerns as she has daily but Pt does not respond with any concern or direction on how she [facility] [place] should proceed  Met with Pt and discussed above conversation with Rosita Benítez  Pt continues to refuse to sign Release of Information for Rosita Benítez or other social supports and continues to refuse to sign Tx plan at this time  Pt sat with eyes closed and when asked by this writer to open her eyes to have conversation, Pt refused stating, "I am being told by God in my communion and meditation with him today to not open my eyes but I am being told I can talk to you " Pt states "I am not concerned about the business- I have been away from the business for a few weeks before and everything was fine- my bills are on automatic pay and 

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
## 2018-04-10 NOTE — PLAN OF CARE
Problem: Ineffective Coping  Goal: Participates in unit activities  Interventions:  - Provide therapeutic environment   - Provide required programming   - Redirect inappropriate behaviors    Outcome: Progressing  Pt not attending groups but did enter the day room to tell the group that she would be taking a shower today

## 2018-04-10 NOTE — PLAN OF CARE
Alteration in Thoughts and Perception     Treatment Goal: Gain control of psychotic behaviors/thinking, reduce/eliminate presenting symptoms and demonstrate improved reality functioning upon discharge Not Progressing     Refrain from acting on delusional thinking/internal stimuli Not Progressing     Agree to be compliant with medication regime, as prescribed and report medication side effects Not Progressing     Attend and participate in unit activities, including therapeutic, recreational, and educational groups Not Progressing     Recognize dysfunctional thoughts, communicate reality-based thoughts at the time of discharge Not Progressing     Complete daily ADLs, including personal hygiene independently, as able Not Progressing          Alteration in Thoughts and Perception     Verbalize thoughts and feelings Progressing        Depression     Treatment Goal: Demonstrate behavioral control of depressive symptoms, verbalize feelings of improved mood/affect, and adopt new coping skills prior to discharge Progressing     Refrain from harming self Progressing     Refrain from isolation Progressing        Prexisting or High Potential for Compromised Skin Integrity     Skin integrity is maintained or improved Progressing

## 2018-04-10 NOTE — PROGRESS NOTES
While pt showering, nurse observed pt's RLE to be reddened and swollen  Pt states 'this is the leg which feels really tight back here ' Pt denies any functional difficulty with LE  Physician made aware and instructed to consult medical  SOD paged, awaiting call back  Will continue to monitor

## 2018-04-10 NOTE — PROGRESS NOTES
Pt out in the small tv room for breakfast  Pt observed to be eating with her eyes closed  Asked pt why she was doing this, for which she responded, 'because god has told me too ' Pt refused to take her PO medication, and asked to have the injection instead  Pt agreeable to showering and washing her hair after breakfast  Pt c/o pain in her right shin/calf area  PA made aware  Will continue to monitor

## 2018-04-11 PROCEDURE — 99233 SBSQ HOSP IP/OBS HIGH 50: CPT | Performed by: PSYCHIATRY & NEUROLOGY

## 2018-04-11 PROCEDURE — 99232 SBSQ HOSP IP/OBS MODERATE 35: CPT | Performed by: INTERNAL MEDICINE

## 2018-04-11 RX ADMIN — OLANZAPINE 10 MG: 10 INJECTION, POWDER, FOR SOLUTION INTRAMUSCULAR at 21:19

## 2018-04-11 RX ADMIN — WATER 10 ML: 1 INJECTION INTRAMUSCULAR; INTRAVENOUS; SUBCUTANEOUS at 09:40

## 2018-04-11 RX ADMIN — WATER 2.1 ML: 1 INJECTION INTRAMUSCULAR; INTRAVENOUS; SUBCUTANEOUS at 21:18

## 2018-04-11 RX ADMIN — OLANZAPINE 5 MG: 10 INJECTION, POWDER, FOR SOLUTION INTRAMUSCULAR at 09:30

## 2018-04-11 NOTE — PROGRESS NOTES
Spent evening resting quietly in bed with eyes closed  Stated she was meditating and when asked about hearing voices stated "nothing has changed" Requesting IM injection stating she prefers that over a po medication and stated it was her choice  Left wrist splint with ace wrap dry and intact, fingers warm to touch  Reports left posterior calf feels tight, mild non pitting edema noted

## 2018-04-11 NOTE — PLAN OF CARE
Problem: Ineffective Coping  Goal: Participates in unit activities  Interventions:  - Provide therapeutic environment   - Provide required programming   - Redirect inappropriate behaviors    Outcome: Progressing  Not active in groups but was present in the day room at group time as she was still eating her meals slowly
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
## 2018-04-11 NOTE — CASE MANAGEMENT
Met with Pt to discuss Tx plan and Release of Information for her friends [facility]ACIA ([facility]) to coordinate care and assess support with discharge planning and Pt continues to refuse to discuss Tx plan and refuses to sign Releases of Information or Tx plan  Pt kept eyes closed and continues to state that she is meditating and denies SI and states she will cooperate with venous duplex study ordered for RLE redness and swelling  Pt states she does not want to take medication orally but will not resist IM medication injection when given
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
## 2018-04-11 NOTE — PROGRESS NOTES
Pt RLE continues with edema  Pt states no discomfort at this time and keeping elevated helps  She refused her a m po meds and states she will just take the shot  Given IM zyprexa  Only went to afternoon group but sits to the side  Denies SI  Will continue to monitor

## 2018-04-12 ENCOUNTER — APPOINTMENT (INPATIENT)
Dept: NON INVASIVE DIAGNOSTICS | Facility: HOSPITAL | Age: 58
DRG: 885 | End: 2018-04-12
Payer: COMMERCIAL

## 2018-04-12 LAB
ANION GAP SERPL CALCULATED.3IONS-SCNC: 8 MMOL/L (ref 4–13)
BUN SERPL-MCNC: 12 MG/DL (ref 5–25)
CALCIUM SERPL-MCNC: 8.7 MG/DL
CHLORIDE SERPL-SCNC: 109 MMOL/L (ref 100–108)
CO2 SERPL-SCNC: 22 MMOL/L (ref 21–32)
CREAT SERPL-MCNC: 0.61 MG/DL (ref 0.6–1.3)
DEPRECATED AT III PPP: 112 % OF NORMAL (ref 92–136)
ERYTHROCYTE [DISTWIDTH] IN BLOOD BY AUTOMATED COUNT: 13.4 % (ref 11.6–15.1)
GFR SERPL CREATININE-BSD FRML MDRD: 101 ML/MIN/1.73SQ M
GLUCOSE SERPL-MCNC: 89 MG/DL (ref 65–140)
HCT VFR BLD AUTO: 26.9 % (ref 34.8–46.1)
HGB BLD-MCNC: 9 G/DL (ref 11.5–15.4)
MCH RBC QN AUTO: 30.8 PG (ref 26.8–34.3)
MCHC RBC AUTO-ENTMCNC: 33.5 G/DL (ref 31.4–37.4)
MCV RBC AUTO: 92 FL (ref 82–98)
PLATELET # BLD AUTO: 355 THOUSANDS/UL (ref 149–390)
PMV BLD AUTO: 8.1 FL (ref 8.9–12.7)
POTASSIUM SERPL-SCNC: 3.7 MMOL/L (ref 3.5–5.3)
PROT C AG ACT/NOR PPP IA: 106 % OF NORMAL (ref 60–150)
RBC # BLD AUTO: 2.92 MILLION/UL (ref 3.81–5.12)
SODIUM SERPL-SCNC: 139 MMOL/L (ref 136–145)
WBC # BLD AUTO: 7.12 THOUSAND/UL (ref 4.31–10.16)

## 2018-04-12 PROCEDURE — 86147 CARDIOLIPIN ANTIBODY EA IG: CPT | Performed by: INTERNAL MEDICINE

## 2018-04-12 PROCEDURE — 99232 SBSQ HOSP IP/OBS MODERATE 35: CPT | Performed by: PSYCHIATRY & NEUROLOGY

## 2018-04-12 PROCEDURE — 85306 CLOT INHIBIT PROT S FREE: CPT | Performed by: INTERNAL MEDICINE

## 2018-04-12 PROCEDURE — 81240 F2 GENE: CPT | Performed by: INTERNAL MEDICINE

## 2018-04-12 PROCEDURE — 93970 EXTREMITY STUDY: CPT | Performed by: SURGERY

## 2018-04-12 PROCEDURE — 85303 CLOT INHIBIT PROT C ACTIVITY: CPT | Performed by: INTERNAL MEDICINE

## 2018-04-12 PROCEDURE — 85305 CLOT INHIBIT PROT S TOTAL: CPT | Performed by: INTERNAL MEDICINE

## 2018-04-12 PROCEDURE — 81241 F5 GENE: CPT | Performed by: INTERNAL MEDICINE

## 2018-04-12 PROCEDURE — 80048 BASIC METABOLIC PNL TOTAL CA: CPT | Performed by: INTERNAL MEDICINE

## 2018-04-12 PROCEDURE — 85732 THROMBOPLASTIN TIME PARTIAL: CPT | Performed by: INTERNAL MEDICINE

## 2018-04-12 PROCEDURE — 99232 SBSQ HOSP IP/OBS MODERATE 35: CPT | Performed by: INTERNAL MEDICINE

## 2018-04-12 PROCEDURE — 86146 BETA-2 GLYCOPROTEIN ANTIBODY: CPT | Performed by: INTERNAL MEDICINE

## 2018-04-12 PROCEDURE — 85705 THROMBOPLASTIN INHIBITION: CPT | Performed by: INTERNAL MEDICINE

## 2018-04-12 PROCEDURE — 85300 ANTITHROMBIN III ACTIVITY: CPT | Performed by: INTERNAL MEDICINE

## 2018-04-12 PROCEDURE — 93970 EXTREMITY STUDY: CPT

## 2018-04-12 PROCEDURE — 85670 THROMBIN TIME PLASMA: CPT | Performed by: INTERNAL MEDICINE

## 2018-04-12 PROCEDURE — 85027 COMPLETE CBC AUTOMATED: CPT | Performed by: INTERNAL MEDICINE

## 2018-04-12 PROCEDURE — 85613 RUSSELL VIPER VENOM DILUTED: CPT | Performed by: INTERNAL MEDICINE

## 2018-04-12 RX ORDER — OLANZAPINE 10 MG/1
10 TABLET ORAL DAILY
Status: DISCONTINUED | OUTPATIENT
Start: 2018-04-13 | End: 2018-04-16

## 2018-04-12 RX ORDER — OLANZAPINE 10 MG/1
5 INJECTION, POWDER, LYOPHILIZED, FOR SOLUTION INTRAMUSCULAR DAILY
Status: DISCONTINUED | OUTPATIENT
Start: 2018-04-13 | End: 2018-04-12

## 2018-04-12 RX ORDER — OLANZAPINE 10 MG/1
10 TABLET ORAL DAILY
Status: DISCONTINUED | OUTPATIENT
Start: 2018-04-13 | End: 2018-04-12

## 2018-04-12 RX ORDER — OLANZAPINE 10 MG/1
INJECTION, POWDER, LYOPHILIZED, FOR SOLUTION INTRAMUSCULAR
Status: DISCONTINUED
Start: 2018-04-12 | End: 2018-04-23 | Stop reason: HOSPADM

## 2018-04-12 RX ORDER — OLANZAPINE 10 MG/1
10 INJECTION, POWDER, LYOPHILIZED, FOR SOLUTION INTRAMUSCULAR DAILY
Status: DISCONTINUED | OUTPATIENT
Start: 2018-04-13 | End: 2018-04-16

## 2018-04-12 RX ADMIN — WATER 10 ML: 1 INJECTION INTRAMUSCULAR; INTRAVENOUS; SUBCUTANEOUS at 08:14

## 2018-04-12 RX ADMIN — ENOXAPARIN SODIUM 135 MG: 150 INJECTION SUBCUTANEOUS at 20:13

## 2018-04-12 RX ADMIN — OLANZAPINE 5 MG: 10 INJECTION, POWDER, FOR SOLUTION INTRAMUSCULAR at 08:13

## 2018-04-12 RX ADMIN — OLANZAPINE 10 MG: 10 INJECTION, POWDER, FOR SOLUTION INTRAMUSCULAR at 21:39

## 2018-04-12 RX ADMIN — BACITRACIN ZINC 1 LARGE APPLICATION: 500 OINTMENT TOPICAL at 18:48

## 2018-04-12 NOTE — PROGRESS NOTES
Pt continues to be seclusive to room and refuses oral Zyprexa and opts for injections instead  Pt stated that she is "in a constant state of mediatation in order to heal and be able to communicate with the creator "  Out for dinner only this evening  Will continue to monitor

## 2018-04-12 NOTE — PLAN OF CARE
Alteration in Thoughts and Perception     Treatment Goal: Gain control of psychotic behaviors/thinking, reduce/eliminate presenting symptoms and demonstrate improved reality functioning upon discharge Not Progressing     Verbalize thoughts and feelings Not Progressing     Refrain from acting on delusional thinking/internal stimuli Not Progressing     Agree to be compliant with medication regime, as prescribed and report medication side effects Not Progressing     Attend and participate in unit activities, including therapeutic, recreational, and educational groups Not Progressing     Recognize dysfunctional thoughts, communicate reality-based thoughts at the time of discharge Not Progressing     Complete daily ADLs, including personal hygiene independently, as able Not Progressing          Depression     Treatment Goal: Demonstrate behavioral control of depressive symptoms, verbalize feelings of improved mood/affect, and adopt new coping skills prior to discharge Progressing     Refrain from harming self Progressing     Refrain from isolation Progressing        Prexisting or High Potential for Compromised Skin Integrity     Skin integrity is maintained or improved Progressing
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
## 2018-04-12 NOTE — PROGRESS NOTES
Pt cooperative with care  Continues to "meditate" stating she is communication with God  Seclusive in room and scant with conversation  Guarded during interactions  Will continue to monitor closely

## 2018-04-12 NOTE — PROGRESS NOTES
SOD Dr [employer] called back, made aware of pt's duplex studies results  Will await orders

## 2018-04-12 NOTE — CASE MANAGEMENT
Met with Pt and attempted to review Tx plan and Pt refuses to review Tx plan or sign Tx plan  Pt with eyes closed during interaction and states that she is "total communion with God" and is not allowed to open her eyes at this time  Pt refusing to take oral medication but is agreeable to IM medication injection  Pt refusing to sign Release of Information for friends/coworker and states that she has been in contact with them via phone and "everything is fine- there is no need for you to call them " Discussed outpatient psychiatric follow-up with Pt at [facility] and Pt refusing to sign Release of Information and states that she does not need psychiatric treatment now or at discharge  Pt states she wants to go home and was reminded that she is currently on a 303 involuntary commitment

## 2018-04-12 NOTE — PROGRESS NOTES
Pt out of bed for breakfast, but returned to room after breakfast  Pt calm and pleasant  Pt refused to open her eyes because she said she was having "communion with god and that god is telling her to keep her eyes closed " Pt also stated she was "healing people with her eyes closed " Pt refused her medication by mouth and took it by IM injection, pt compliant with injection  Pt did not attend group  Pt currently lying in bed, will continue to monitor

## 2018-04-12 NOTE — PROGRESS NOTES
Received a call from Deondre Freeman in vascular lab regarding pts duplex studies  Was informed pt was positive for an acute occlusive DVT of the right popliteal vein, perineal vein, and posterior tibial vein  SOD-C was paged and attending MD made aware  Pt instructed not to massage her leg, as well as not performing any extraneous activities at this time  Pt displayed understanding  Will continue to monitor

## 2018-04-12 NOTE — PLAN OF CARE
Problem: Ineffective Coping  Goal: Participates in unit activities  Interventions:  - Provide therapeutic environment   - Provide required programming   - Redirect inappropriate behaviors    Outcome: Not Progressing  Pt  Is sitting in the dayroom with leg elevated as instructed but continues to retreat to her room when groups begin
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
## 2018-04-12 NOTE — PROGRESS NOTES
Shantell Winter, RN spoke with vascular lab to determine why patient is not on for her lower extremity duplex yet  Patient has been unable to be scheduled due to emergent vascular cases as per nursing staff  Will continue to follow up and ensure patient gets her lower extremity duplex completed

## 2018-04-13 LAB
CARDIOLIPIN IGA SER IA-ACNC: <9 APL U/ML (ref 0–11)
CARDIOLIPIN IGG SER IA-ACNC: <9 GPL U/ML (ref 0–14)
CARDIOLIPIN IGM SER IA-ACNC: <9 MPL U/ML (ref 0–12)

## 2018-04-13 PROCEDURE — 99233 SBSQ HOSP IP/OBS HIGH 50: CPT | Performed by: PSYCHIATRY & NEUROLOGY

## 2018-04-13 RX ORDER — GINSENG 100 MG
1 CAPSULE ORAL 2 TIMES DAILY PRN
Status: DISCONTINUED | OUTPATIENT
Start: 2018-04-13 | End: 2018-04-23 | Stop reason: HOSPADM

## 2018-04-13 RX ADMIN — OLANZAPINE 10 MG: 10 INJECTION, POWDER, FOR SOLUTION INTRAMUSCULAR at 08:13

## 2018-04-13 RX ADMIN — OLANZAPINE 10 MG: 10 INJECTION, POWDER, FOR SOLUTION INTRAMUSCULAR at 21:31

## 2018-04-13 RX ADMIN — ENOXAPARIN SODIUM 135 MG: 150 INJECTION SUBCUTANEOUS at 21:24

## 2018-04-13 RX ADMIN — WATER 10 ML: 1 INJECTION INTRAMUSCULAR; INTRAVENOUS; SUBCUTANEOUS at 08:16

## 2018-04-13 RX ADMIN — BACITRACIN ZINC 1 LARGE APPLICATION: 500 OINTMENT TOPICAL at 08:16
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
## 2018-04-13 NOTE — PLAN OF CARE
Problem: DISCHARGE PLANNING  Goal: Discharge to home or other facility with appropriate resources  INTERVENTIONS:  - Identify barriers to discharge w/patient and caregiver  - Arrange for needed discharge resources and transportation as appropriate  - Identify discharge learning needs (meds, wound care, etc )  - Arrange for interpretive services to assist at discharge as needed  - Refer to Case Management Department for coordinating discharge planning if the patient needs post-hospital services based on physician/advanced practitioner order or complex needs related to functional status, cognitive ability, or social support system   Outcome: Not Progressing  Pt refusing psychotropic medication, refusing to sign Tx plan or Release of Information for coordination of care with social supports (friends)  Pt is on 303 involuntary commitment (expires 4/25/18) and receiving medications over objection at this time

## 2018-04-13 NOTE — CASE MANAGEMENT
Met with Pt again to discuss Tx plan and Release of Information for her friends [facility] and [facility] to coordinate care and assess support with discharge planning and Pt continues to refuse to discuss Tx plan and refuses to sign Releases of Information or Tx plan  Pt closed her eyes when this writer entered the room and states "I am being told to keep my eyes closed because I am in communion with God " Pt states she will cooperate with treatment for DVT of RLE discovered via duplex ultrasound 4/12/18  Pt states she will not take psychotropic medication by mouth but will cooperate with IM injection of psychotropic medication  Discussed outpatient psychiatric follow-up with Pt and continues to state that there is no reason for this hospitalization and does need psychiatric treatment currently or at discharge  Will continue to assess and monitor  and offer case management/discharge planning services

## 2018-04-13 NOTE — PROGRESS NOTES
Pt very drowsy this AM  When asked if she was feeling okay, pt stated 'Just really sleepy ' Pt refused PO medication, only wanted to take the IM  Questioned why this was the case and pt replied 'I just want it this way for now ' Pt requested to eat in her room this AM because she was fearful of getting up and walking around too much with her leg  Pt agreeable to coming out later on in the day  Pt states 'I am still in communion with god today,' when asked if she was hearing or seeing anything that may not be there  Pt denies SI and all other s/s  Will continue to monitor

## 2018-04-13 NOTE — PROGRESS NOTES
Observed pt talking to herself in the middle of the hallway  Questioned pt and she replied 'I am talking to god  I'm in communion with him right now ' Will continue to monitor

## 2018-04-14 LAB
B2 GLYCOPROT1 IGA SER-ACNC: <9 GPI IGA UNITS (ref 0–25)
B2 GLYCOPROT1 IGG SER-ACNC: <9 GPI IGG UNITS (ref 0–20)
B2 GLYCOPROT1 IGM SER-ACNC: <9 GPI IGM UNITS (ref 0–32)
PROT S ACT/NOR PPP: 106 % (ref 57–157)
PROT S ACT/NOR PPP: 90 % (ref 63–140)
PROT S PPP-ACNC: 92 % (ref 60–150)

## 2018-04-14 PROCEDURE — 99232 SBSQ HOSP IP/OBS MODERATE 35: CPT | Performed by: PSYCHIATRY & NEUROLOGY

## 2018-04-14 RX ADMIN — WATER 10 ML: 1 INJECTION INTRAMUSCULAR; INTRAVENOUS; SUBCUTANEOUS at 22:04

## 2018-04-14 RX ADMIN — OLANZAPINE 10 MG: 10 INJECTION, POWDER, FOR SOLUTION INTRAMUSCULAR at 09:39

## 2018-04-14 RX ADMIN — OLANZAPINE 10 MG: 10 INJECTION, POWDER, FOR SOLUTION INTRAMUSCULAR at 22:02

## 2018-04-14 RX ADMIN — ENOXAPARIN SODIUM 135 MG: 150 INJECTION SUBCUTANEOUS at 21:59

## 2018-04-14 NOTE — PLAN OF CARE
Alteration in Thoughts and Perception     Treatment Goal: Gain control of psychotic behaviors/thinking, reduce/eliminate presenting symptoms and demonstrate improved reality functioning upon discharge Not Progressing     Refrain from acting on delusional thinking/internal stimuli Not Progressing     Agree to be compliant with medication regime, as prescribed and report medication side effects Not Progressing     Recognize dysfunctional thoughts, communicate reality-based thoughts at the time of discharge Not Progressing        Depression     Refrain from isolation Not Progressing        Patient has been withdrawn to her room  Patient has been delusional and has no insight into delusional state  Patient refuses medication    Alteration in Thoughts and Perception     Verbalize thoughts and feelings Progressing     Complete daily ADLs, including personal hygiene independently, as able Progressing        Depression     Treatment Goal: Demonstrate behavioral control of depressive symptoms, verbalize feelings of improved mood/affect, and adopt new coping skills prior to discharge Progressing     Refrain from harming self Progressing        Prexisting or High Potential for Compromised Skin Integrity     Skin integrity is maintained or improved Progressing
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
## 2018-04-14 NOTE — PROGRESS NOTES
Patient refused her Zyprexa this morning and received it IM  Patient states she is "declining the pill because it is my right"  Patient was cooperative receiving IM Zyprexa  Patient has been laying in bed today and stated she "communes with God"  Patient lays in bed with her eyes closed and raises her eyebrows  Patient states "when I communicate with God my eyes blink"  Patient denies having any auditory hallucinations  Patient denies all symptoms except for 8/10 pain in her right leg

## 2018-04-14 NOTE — PROGRESS NOTES
Pt was cooperative with care  Pt denied any SI/HI or pain  Pt denied any depression or anxiety  Pt was observed standing in her room speaking  Pt reports she was conversing with God  Pt spent majority of the evening in her room, but did come out for dinner  Pt refused the oral Zyprexa 10 mg, but accepted the IM of Zyprexa 10 mg, without issue  Will continue to monitor

## 2018-04-15 LAB
ALBUMIN SERPL BCP-MCNC: 2.7 G/DL (ref 3.5–5)
ALP SERPL-CCNC: 122 U/L (ref 46–116)
ALT SERPL W P-5'-P-CCNC: 23 U/L (ref 12–78)
APTT SCREEN TO CONFIRM RATIO: 0.84 RATIO (ref 0–1.4)
AST SERPL W P-5'-P-CCNC: 17 U/L (ref 5–45)
BASOPHILS # BLD AUTO: 0.05 THOUSANDS/ΜL (ref 0–0.1)
BASOPHILS NFR BLD AUTO: 1 % (ref 0–1)
BILIRUB DIRECT SERPL-MCNC: 0.1 MG/DL (ref 0–0.2)
BILIRUB SERPL-MCNC: 0.34 MG/DL (ref 0.2–1)
CONFIRM APTT/NORMAL: 44.4 SEC (ref 0–55)
EOSINOPHIL # BLD AUTO: 0.17 THOUSAND/ΜL (ref 0–0.61)
EOSINOPHIL NFR BLD AUTO: 3 % (ref 0–6)
ERYTHROCYTE [DISTWIDTH] IN BLOOD BY AUTOMATED COUNT: 13.7 % (ref 11.6–15.1)
HCT VFR BLD AUTO: 27.6 % (ref 34.8–46.1)
HGB BLD-MCNC: 8.9 G/DL (ref 11.5–15.4)
LA PPP-IMP: NORMAL
LYMPHOCYTES # BLD AUTO: 2.3 THOUSANDS/ΜL (ref 0.6–4.47)
LYMPHOCYTES NFR BLD AUTO: 43 % (ref 14–44)
MCH RBC QN AUTO: 30.6 PG (ref 26.8–34.3)
MCHC RBC AUTO-ENTMCNC: 32.2 G/DL (ref 31.4–37.4)
MCV RBC AUTO: 95 FL (ref 82–98)
MONOCYTES # BLD AUTO: 0.31 THOUSAND/ΜL (ref 0.17–1.22)
MONOCYTES NFR BLD AUTO: 6 % (ref 4–12)
NEUTROPHILS # BLD AUTO: 2.48 THOUSANDS/ΜL (ref 1.85–7.62)
NEUTS SEG NFR BLD AUTO: 47 % (ref 43–75)
NRBC BLD AUTO-RTO: 0 /100 WBCS
PLATELET # BLD AUTO: 432 THOUSANDS/UL (ref 149–390)
PMV BLD AUTO: 8.3 FL (ref 8.9–12.7)
PROT SERPL-MCNC: 6.6 G/DL (ref 6.4–8.2)
RBC # BLD AUTO: 2.91 MILLION/UL (ref 3.81–5.12)
SCREEN APTT: 39 SEC (ref 0–51.9)
SCREEN DRVVT: 41.2 SEC (ref 0–47)
THROMBIN TIME: 18.6 SEC (ref 0–23)
WBC # BLD AUTO: 5.33 THOUSAND/UL (ref 4.31–10.16)

## 2018-04-15 PROCEDURE — 80076 HEPATIC FUNCTION PANEL: CPT | Performed by: PHYSICIAN ASSISTANT

## 2018-04-15 PROCEDURE — 86592 SYPHILIS TEST NON-TREP QUAL: CPT | Performed by: PHYSICIAN ASSISTANT

## 2018-04-15 PROCEDURE — 99232 SBSQ HOSP IP/OBS MODERATE 35: CPT | Performed by: PSYCHIATRY & NEUROLOGY

## 2018-04-15 PROCEDURE — 85025 COMPLETE CBC W/AUTO DIFF WBC: CPT | Performed by: PHYSICIAN ASSISTANT

## 2018-04-15 RX ORDER — HALOPERIDOL 2 MG/1
2 TABLET ORAL
Status: DISCONTINUED | OUTPATIENT
Start: 2018-04-15 | End: 2018-04-16

## 2018-04-15 RX ADMIN — WATER 10 ML: 1 INJECTION INTRAMUSCULAR; INTRAVENOUS; SUBCUTANEOUS at 22:42

## 2018-04-15 RX ADMIN — WATER 2.1 ML: 1 INJECTION INTRAMUSCULAR; INTRAVENOUS; SUBCUTANEOUS at 08:13

## 2018-04-15 RX ADMIN — OLANZAPINE 10 MG: 10 INJECTION, POWDER, FOR SOLUTION INTRAMUSCULAR at 22:41

## 2018-04-15 RX ADMIN — OLANZAPINE 10 MG: 10 INJECTION, POWDER, FOR SOLUTION INTRAMUSCULAR at 08:15

## 2018-04-15 RX ADMIN — ENOXAPARIN SODIUM 135 MG: 150 INJECTION SUBCUTANEOUS at 21:59

## 2018-04-15 NOTE — PROGRESS NOTES
Pt withdrawn in her room  Pt refused PO dose of zypreza and took it by injection  Pt compliant with injection  Pt denies anxiety, depression, SI and HI  Pt opened her eyes while she got the injection, but returned to "constant communion with god" with her eyes closed afterwards  Pt c/o pain 6/10 in her leg, but pt refused prn pain medication "I just want to rest and see how it does  It feels better than yesterday " Pt received meals in her room in her bed  Pt currently lying in bed with her eyes closed, will continue to monitor
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
## 2018-04-15 NOTE — PLAN OF CARE
Alteration in Thoughts and Perception     Treatment Goal: Gain control of psychotic behaviors/thinking, reduce/eliminate presenting symptoms and demonstrate improved reality functioning upon discharge Not Progressing     Refrain from acting on delusional thinking/internal stimuli Not Progressing     Agree to be compliant with medication regime, as prescribed and report medication side effects Not Progressing     Attend and participate in unit activities, including therapeutic, recreational, and educational groups Not Progressing     Recognize dysfunctional thoughts, communicate reality-based thoughts at the time of discharge Not Progressing     Complete daily ADLs, including personal hygiene independently, as able Not Progressing        Depression     Refrain from isolation Not Progressing    Pt remains in room and continues acting on delusional thoughts         Alteration in Thoughts and Perception     Verbalize thoughts and feelings Progressing        Depression     Treatment Goal: Demonstrate behavioral control of depressive symptoms, verbalize feelings of improved mood/affect, and adopt new coping skills prior to discharge Progressing     Refrain from harming self Progressing        Prexisting or High Potential for Compromised Skin Integrity     Skin integrity is maintained or improved Progressing

## 2018-04-15 NOTE — PROGRESS NOTES
Pt continues reporting 8/10 pain of RLE, but refuses pain medication  Denies all hallucinations and symptoms currently and in past  Remains in bed with eyes closed reportedly meditating and communicating with God  Was agreeable to Zyprexa IM only

## 2018-04-16 LAB
ATRIAL RATE: 95 BPM
BASOPHILS # BLD AUTO: 0.04 THOUSANDS/ΜL (ref 0–0.1)
BASOPHILS NFR BLD AUTO: 1 % (ref 0–1)
EOSINOPHIL # BLD AUTO: 0.13 THOUSAND/ΜL (ref 0–0.61)
EOSINOPHIL NFR BLD AUTO: 2 % (ref 0–6)
ERYTHROCYTE [DISTWIDTH] IN BLOOD BY AUTOMATED COUNT: 13.9 % (ref 11.6–15.1)
F2 GENE MUT ANL BLD/T: NORMAL
HCT VFR BLD AUTO: 28.1 % (ref 34.8–46.1)
HGB BLD-MCNC: 9.1 G/DL (ref 11.5–15.4)
LYMPHOCYTES # BLD AUTO: 2.2 THOUSANDS/ΜL (ref 0.6–4.47)
LYMPHOCYTES NFR BLD AUTO: 40 % (ref 14–44)
MCH RBC QN AUTO: 31 PG (ref 26.8–34.3)
MCHC RBC AUTO-ENTMCNC: 32.4 G/DL (ref 31.4–37.4)
MCV RBC AUTO: 96 FL (ref 82–98)
MONOCYTES # BLD AUTO: 0.56 THOUSAND/ΜL (ref 0.17–1.22)
MONOCYTES NFR BLD AUTO: 10 % (ref 4–12)
NEUTROPHILS # BLD AUTO: 2.59 THOUSANDS/ΜL (ref 1.85–7.62)
NEUTS SEG NFR BLD AUTO: 47 % (ref 43–75)
NRBC BLD AUTO-RTO: 0 /100 WBCS
P AXIS: 52 DEGREES
PLATELET # BLD AUTO: 420 THOUSANDS/UL (ref 149–390)
PMV BLD AUTO: 8 FL (ref 8.9–12.7)
PR INTERVAL: 154 MS
QRS AXIS: 23 DEGREES
QRSD INTERVAL: 76 MS
QT INTERVAL: 348 MS
QTC INTERVAL: 437 MS
RBC # BLD AUTO: 2.94 MILLION/UL (ref 3.81–5.12)
RPR SER QL: NORMAL
T WAVE AXIS: 19 DEGREES
VENTRICULAR RATE: 95 BPM
WBC # BLD AUTO: 5.53 THOUSAND/UL (ref 4.31–10.16)

## 2018-04-16 PROCEDURE — 93005 ELECTROCARDIOGRAM TRACING: CPT

## 2018-04-16 PROCEDURE — 99231 SBSQ HOSP IP/OBS SF/LOW 25: CPT | Performed by: PSYCHIATRY & NEUROLOGY

## 2018-04-16 PROCEDURE — 85025 COMPLETE CBC W/AUTO DIFF WBC: CPT | Performed by: PHYSICIAN ASSISTANT

## 2018-04-16 PROCEDURE — 93010 ELECTROCARDIOGRAM REPORT: CPT | Performed by: INTERNAL MEDICINE

## 2018-04-16 RX ORDER — HALOPERIDOL 5 MG
5 TABLET ORAL 2 TIMES DAILY
Status: DISCONTINUED | OUTPATIENT
Start: 2018-04-17 | End: 2018-04-17

## 2018-04-16 RX ORDER — HALOPERIDOL 5 MG/ML
5 INJECTION INTRAMUSCULAR 2 TIMES DAILY
Status: DISCONTINUED | OUTPATIENT
Start: 2018-04-17 | End: 2018-04-17

## 2018-04-16 RX ADMIN — WATER 10 ML: 1 INJECTION INTRAMUSCULAR; INTRAVENOUS; SUBCUTANEOUS at 09:16

## 2018-04-16 RX ADMIN — OLANZAPINE 10 MG: 10 INJECTION, POWDER, FOR SOLUTION INTRAMUSCULAR at 21:28

## 2018-04-16 RX ADMIN — OLANZAPINE 10 MG: 10 INJECTION, POWDER, FOR SOLUTION INTRAMUSCULAR at 09:16

## 2018-04-16 RX ADMIN — ENOXAPARIN SODIUM 135 MG: 150 INJECTION SUBCUTANEOUS at 21:24

## 2018-04-16 NOTE — PLAN OF CARE
Problem: DISCHARGE PLANNING  Goal: Discharge to home or other facility with appropriate resources  INTERVENTIONS:  - Identify barriers to discharge w/patient and caregiver  - Arrange for needed discharge resources and transportation as appropriate  - Identify discharge learning needs (meds, wound care, etc )  - Arrange for interpretive services to assist at discharge as needed  - Refer to Case Management Department for coordinating discharge planning if the patient needs post-hospital services based on physician/advanced practitioner order or complex needs related to functional status, cognitive ability, or social support system   Outcome: Not Progressing  Patient still not interested in signing release of information for discharge planning for outpatient providers  Patient remains psychotic and not interested to speak about aftercare  Patient kept her eyes closed during conversation and continues to meditate and is seclusive
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
## 2018-04-16 NOTE — PROGRESS NOTES
Pt continues to be in "communication with God" thru almost constant meditation  Observed occasionally walking hallway  Pleasant upon approach  Denies SI and thoughts of self harm  Will contunue to monitor

## 2018-04-16 NOTE — PROGRESS NOTES
Pt out of room more this AM, talking to her friend on the phone  Presented pt with both her PO or IM medication  Pt stated 'I am not taking the pill, please give me the injection ' When asked why pt only wanted the shot, pt states, 'it is my choice and that is what I want to do ' Pt would not open her eyes to look at staff stating, 'I'm still in communion with god and he does not want me looking at you ' Pt denies SI  Will continue to monitor

## 2018-04-16 NOTE — PROGRESS NOTES
Pt remained in bed for most of evening, keeping her eyes closed even while conversing  Refused scheduled haldol  Denies SI/HI/AH/VH and pain  Wishes to continue listening to God's words alone in her room

## 2018-04-16 NOTE — PROGRESS NOTES
This writer touched base with patient  Patient kept her eyes closed during the conversation  Continues to report that she is meditating and speaking with God  Patient had no concerns or questions regarding discharge at this time

## 2018-04-16 NOTE — PLAN OF CARE
Problem: Ineffective Coping  Goal: Participates in unit activities  Interventions:  - Provide therapeutic environment   - Provide required programming   - Redirect inappropriate behaviors    Outcome: Progressing  Pt  refused morning groups but did remained in the day room for afternoon Life Skills session  She did not open her eyes and although she said that she would just listen,she did not respond to any of the group conversation on looking at both sides of a conversation

## 2018-04-17 LAB — F5 GENE MUT ANL BLD/T: NORMAL

## 2018-04-17 PROCEDURE — 99232 SBSQ HOSP IP/OBS MODERATE 35: CPT | Performed by: PSYCHIATRY & NEUROLOGY

## 2018-04-17 RX ORDER — HALOPERIDOL 5 MG/ML
10 INJECTION INTRAMUSCULAR 2 TIMES DAILY
Status: DISCONTINUED | OUTPATIENT
Start: 2018-04-17 | End: 2018-04-20

## 2018-04-17 RX ORDER — HALOPERIDOL 5 MG
10 TABLET ORAL 2 TIMES DAILY
Status: DISCONTINUED | OUTPATIENT
Start: 2018-04-17 | End: 2018-04-23 | Stop reason: HOSPADM

## 2018-04-17 RX ADMIN — HALOPERIDOL 10 MG: 5 TABLET ORAL at 17:13

## 2018-04-17 RX ADMIN — HALOPERIDOL LACTATE 5 MG: 5 INJECTION, SOLUTION INTRAMUSCULAR at 08:46

## 2018-04-17 RX ADMIN — OLANZAPINE 10 MG: 10 TABLET, FILM COATED ORAL at 21:07

## 2018-04-17 RX ADMIN — ENOXAPARIN SODIUM 135 MG: 150 INJECTION SUBCUTANEOUS at 21:07

## 2018-04-17 NOTE — PLAN OF CARE
Alteration in Thoughts and Perception     Treatment Goal: Gain control of psychotic behaviors/thinking, reduce/eliminate presenting symptoms and demonstrate improved reality functioning upon discharge Not Progressing     Refrain from acting on delusional thinking/internal stimuli Not Progressing     Attend and participate in unit activities, including therapeutic, recreational, and educational groups Not Progressing     Recognize dysfunctional thoughts, communicate reality-based thoughts at the time of discharge Not Progressing     Complete daily ADLs, including personal hygiene independently, as able Not Progressing        Depression     Refrain from isolation Not Progressing          Alteration in Thoughts and Perception     Verbalize thoughts and feelings Progressing     Agree to be compliant with medication regime, as prescribed and report medication side effects Progressing        Depression     Treatment Goal: Demonstrate behavioral control of depressive symptoms, verbalize feelings of improved mood/affect, and adopt new coping skills prior to discharge Progressing     Refrain from harming self Progressing        Prexisting or High Potential for Compromised Skin Integrity     Skin integrity is maintained or improved Progressing

## 2018-04-17 NOTE — PROGRESS NOTES
Pt cooperative with care this evening  Pt denies all suicidal/homicidal thoughts, hallucinations, depression/anxiety and pain  Pt observed in his room throughout the evening; pt reports she is meditating and communicating with God  Pt reports her right leg is beginning to feel better  Pt agreed to take the oral form of her 1600 Haldol this evening  Pt's eye contact has improved significantly  Will continue to monitor
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
## 2018-04-17 NOTE — PLAN OF CARE
Problem: DISCHARGE PLANNING  Goal: Discharge to home or other facility with appropriate resources  INTERVENTIONS:  - Identify barriers to discharge w/patient and caregiver  - Arrange for needed discharge resources and transportation as appropriate  - Identify discharge learning needs (meds, wound care, etc )  - Arrange for interpretive services to assist at discharge as needed  - Refer to Case Management Department for coordinating discharge planning if the patient needs post-hospital services based on physician/advanced practitioner order or complex needs related to functional status, cognitive ability, or social support system   Outcome: Not Progressing  Pt continues to refuse to sign Tx plan and refuses to sign Release of Information for contact with her friend  Pt continues to refuse oral psychotropic medications but is cooperative with IM administration of psychotropic medications  Pt continues to verbalize that there is no reason for her to remain hospitalized and states on discharge she will return to her residence and work as a   Pt refusing to discuss outpatient psychiatric follow-up at this time

## 2018-04-17 NOTE — PROGRESS NOTES
Pt calm and cooperative, continue to be in a constant state of meditation where she speaks with god  Is still refusing PO medication and requesting IM medications instead  Did attend morning group but was not social  Will continue to monitor

## 2018-04-17 NOTE — PROGRESS NOTES
Pt continues to refuse oral medications stating "I will not take them while I am here " IM administered as per order
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
## 2018-04-17 NOTE — PLAN OF CARE
Problem: Ineffective Coping  Goal: Participates in unit activities  Interventions:  - Provide therapeutic environment   - Provide required programming   - Redirect inappropriate behaviors    Outcome: Progressing  Pt  atended and actively engaged in art relaxation session for the first times since admission  She named her project with a hopeful title, then retreated back to her room for the afternoon  Pt responding more(opening eyes) when approached with the concept of respectfulness

## 2018-04-18 PROCEDURE — 99231 SBSQ HOSP IP/OBS SF/LOW 25: CPT | Performed by: PSYCHIATRY & NEUROLOGY

## 2018-04-18 RX ORDER — OLANZAPINE 10 MG/1
5 INJECTION, POWDER, LYOPHILIZED, FOR SOLUTION INTRAMUSCULAR
Status: DISCONTINUED | OUTPATIENT
Start: 2018-04-18 | End: 2018-04-19

## 2018-04-18 RX ORDER — OLANZAPINE 5 MG/1
5 TABLET ORAL
Status: DISCONTINUED | OUTPATIENT
Start: 2018-04-18 | End: 2018-04-19

## 2018-04-18 RX ADMIN — OLANZAPINE 5 MG: 5 TABLET, FILM COATED ORAL at 21:13

## 2018-04-18 RX ADMIN — HALOPERIDOL 10 MG: 5 TABLET ORAL at 08:45

## 2018-04-18 RX ADMIN — HALOPERIDOL 10 MG: 5 TABLET ORAL at 17:17

## 2018-04-18 RX ADMIN — ENOXAPARIN SODIUM 135 MG: 150 INJECTION SUBCUTANEOUS at 21:13

## 2018-04-18 NOTE — CASE MANAGEMENT
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 [place], [place] - for PCP)  Faxed new client registration form and demographic sheet to [facility] and awaiting call back from [facility] to schedule intake appointment for Pt to receive outpatient psychiatry and therapy after discharge  Pt is continuing to refuse to sign Tx plan and Release of Information for her friends STACIA ([facility] and Jigar Diego) at this time

## 2018-04-18 NOTE — PROGRESS NOTES
Pt is cooperative with care, took PO medication without additional encouragement needed  Pt has been out for meals, no social interaction but pleasant upon approach  Eye contact is improving  Pt denies s/s, denies si/hi  Will monitor

## 2018-04-18 NOTE — PLAN OF CARE
Problem: DISCHARGE PLANNING  Goal: Discharge to home or other facility with appropriate resources  INTERVENTIONS:  - Identify barriers to discharge w/patient and caregiver  - Arrange for needed discharge resources and transportation as appropriate  - Identify discharge learning needs (meds, wound care, etc )  - Arrange for interpretive services to assist at discharge as needed  - Refer to Case Management Department for coordinating discharge planning if the patient needs post-hospital services based on physician/advanced practitioner order or complex needs related to functional status, cognitive ability, or social support system   Outcome: Progressing  Release of Information signed by Pt for [facility] for outpatient psychiatry provider referral and [facility] for PCP referral
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
## 2018-04-18 NOTE — PLAN OF CARE
Problem: Ineffective Coping  Goal: Participates in unit activities  Interventions:  - Provide therapeutic environment   - Provide required programming   - Redirect inappropriate behaviors    Outcome: Progressing  Pt  With better eye contact longer today and engaged fully in afternoon life skill group on reminiscing  She contributed to discussion of pleasant travels in her life but only when prompted to speak

## 2018-04-19 PROCEDURE — 99232 SBSQ HOSP IP/OBS MODERATE 35: CPT | Performed by: PSYCHIATRY & NEUROLOGY

## 2018-04-19 PROCEDURE — 99254 IP/OBS CNSLTJ NEW/EST MOD 60: CPT | Performed by: PSYCHIATRY & NEUROLOGY

## 2018-04-19 RX ADMIN — ENOXAPARIN SODIUM 135 MG: 150 INJECTION SUBCUTANEOUS at 20:41

## 2018-04-19 RX ADMIN — HALOPERIDOL 10 MG: 5 TABLET ORAL at 08:24

## 2018-04-19 RX ADMIN — HALOPERIDOL 10 MG: 5 TABLET ORAL at 16:59

## 2018-04-19 NOTE — PROGRESS NOTES
Pt was cooperative with care this evening  Pt denied all symptoms of depression/anxiety, suicidal/homicidal thoughts, hallucinations and pain  Pt was med compliant this evening, including her meds over objection oral medications  Pt was more consistent with eye contact this evening  Pt remained in her room for most of the evening, but came out for dinner  Pt was observed "meditating and conversing with God" throughout the evening  Will continue to monitor

## 2018-04-19 NOTE — PLAN OF CARE
Problem: DISCHARGE PLANNING  Goal: Discharge to home or other facility with appropriate resources  INTERVENTIONS:  - Identify barriers to discharge w/patient and caregiver  - Arrange for needed discharge resources and transportation as appropriate  - Identify discharge learning needs (meds, wound care, etc )  - Arrange for interpretive services to assist at discharge as needed  - Refer to Case Management Department for coordinating discharge planning if the patient needs post-hospital services based on physician/advanced practitioner order or complex needs related to functional status, cognitive ability, or social support system   Outcome: Progressing  Awaiting call back from [facility] regarding intake for Pt as new client to receive outpatient psychiatry and therapy  Will schedule initial PCP appointment for Pt @ [place] [id number] when closer to discharge
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
## 2018-04-19 NOTE — PROGRESS NOTES
Pt calm and cooperative  Med compliant  Took medication whole by mouth  Denies SI/HI/AH/VH at this time  Denies talking to God  Pt participated in group  Eye contact continues to improve  Will continue to monitor

## 2018-04-19 NOTE — PROGRESS NOTES
Pt calm, bright and pleasant on approach  Pt denies SI, Hi, A/VH  Patient is med compliant  Pt concerned that she might miss consult with physician if she showered  Pt assured the she would not miss consult  Will continue to monitor

## 2018-04-19 NOTE — PROGRESS NOTES
New Order for MRI  MRI checklist completed in [facility]  Called MRI and requested PT to be scheduled  MRI will call back with time

## 2018-04-19 NOTE — CONSULTS
Consultation - Neurology   Eladia Stephenson 63 y o  female MRN: [id number]  Unit/Bed#: AK8 255-06 Encounter: [id number]    Assessment/Plan   26-year-old female who presents with multiple self-inflicted injuries, stabbing herself the chest and coming off her ring finger she also injured her toe with a hammer she also had multiple marks on her skin, she had Yarsani preoccupied thoughts  The patient was admitted to [facility] under psychiatric care, she is being followed by the psychiatrist and started on a antipsychotic regimen for her psychosis  She seems to be improving  Neurology was asked to see the patient regards to possible seizure causing her symptoms or a limbic encephalitis  CT of the head was completed on March 30th which showed no acute intracranial abnormality, neurological examination as below without any dense of focal weakness noted  She also is being treated for a right lower extremity dvt, she is on lovonex  This appears less likely to be a limbic encephalitis or seizure causing her psychosis  This appears to be underlying psychiatric condition  However will have an MRI of the brain completed as well as an EEG, to look for any acute pathology, would hold at this time on LP and further limbic encephalitis panel work up, unless above tests are abnormal   We will also check a serum b12 and TEODORO screen  - Reviewed with patient secondary to pain on head - will have EEG completed as an out patient, follow up with neurology as out patient    History of Present Illness     Reason for Consult / Principal Problem: acute pyschosis    HPI: Devora Gonzalez is a 63 y o   female who presents with multiple self-inflicted injuries, stabbing herself in chest and cutting off of her ring finger  She also injured her toes and with a hammer, she also had multiple burn marks on her skin  She apparently was communicating with Yulia Hernandez, and God    She was admitted to inpatient psych on April 3, 2018  Patient has been treated with antipsychotic psychiatric medications and these were adjusted accordingly, most recent notes from pyschiatry reports continue with Haldol medication and discontinue olanzapine  Most recent note reports some improvement in her pychosis  Neurology was asked to see the patient in regards to her acute psychosis and to rule out limbic encephalitis, temporal lobe seizures  On 03/30/2018 the patient had a CT of the head showed no acute intracranial abnormality  The patient denies any neurological history, she denies stroke, history of seizure, history of concussion, history of traumatic brain injury, history of meningitis or encephalitis  She denies being diagnosed with any prior psychiatric condition  She denies his prior psychiatric admissions  She reports she is in the hospital because she could off her finger and hurt herself because god was telling her to do this, however, now she knows this was not god this was the devil  She reports currently she is feeling well  She denies delusion or hallucinations, she reports she was in deep mediatation last week and now knows God did not do this to her  She denies any recent fever or chills, she reports these thought came on fairly quickly  No contact avilable to review hx  She denies any family history of neurological or psychiatric disease  Past medical history /surgical history - reviewed     She denies alcohol or drugs, she is a hairdresser by [employer]  Inpatient consult to Neurology  Consult performed by: Mindi Brantley ordered by: Deon Davis        Review of Systems   All other systems reviewed and are negative      Historical Information   Past Medical History:   Diagnosis Date    Acute psychosis 3/31/2018     Past Surgical History:   Procedure Laterality Date    AMPUTATION FINGER / THUMB Left     ring, self inflicted    FRACTURE SURGERY      L ring finger     Social History   History Alcohol Use    Yes     Comment: socially, rarely     History   Drug Use No     History   Smoking Status    Never Smoker   Smokeless Tobacco    Never Used     Family History:   Family History   Problem Relation Age of Onset    Hypertension Mother     Diverticulitis Mother     Alcohol abuse Father      Review of previous medical records was completed       Meds/Allergies   all current active meds have been reviewed, current meds:   Current Facility-Administered Medications   Medication Dose Route Frequency    acetaminophen (TYLENOL) tablet 650 mg  650 mg Oral Q6H PRN    bacitracin topical ointment 1 large application  1 large application Topical BID PRN    enoxaparin (LOVENOX) subcutaneous injection 135 mg  1 5 mg/kg Subcutaneous Q24H BRIGIDA    haloperidol (HALDOL) tablet 10 mg  10 mg Oral BID    Or    haloperidol lactate (HALDOL) injection 10 mg  10 mg Intramuscular BID    haloperidol (HALDOL) tablet 5 mg  5 mg Oral Q8H PRN    haloperidol lactate (HALDOL) injection 5 mg  5 mg Intramuscular Q8H PRN    ibuprofen (MOTRIN) tablet 400 mg  400 mg Oral Q8H PRN    LORazepam (ATIVAN) 2 mg/mL injection 1 mg  1 mg Intramuscular Q6H PRN    LORazepam (ATIVAN) tablet 1 mg  1 mg Oral Q6H PRN    OLANZapine (ZyPREXA) IM injection 10 mg  10 mg Intramuscular Q8H PRN    risperiDONE (RisperDAL M-TABS) dispersible tablet 1 mg  1 mg Oral Q8H PRN    sterile water injection **AcuDose Override Pull**        sterile water injection **AcuDose Override Pull**        sterile water injection **AcuDose Override Pull**        sterile water injection **AcuDose Override Pull**        sterile water injection **AcuDose Override Pull**        sterile water injection **AcuDose Override Pull**        sterile water injection **AcuDose Override Pull**        sterile water injection **AcuDose Override Pull**        traMADol (ULTRAM) tablet 50 mg  50 mg Oral Q6H PRN    zolpidem (AMBIEN) tablet 5 mg  5 mg Oral HS PRN    and PTA meds: Prior to Admission Medications   Prescriptions Last Dose Informant Patient Reported? Taking?   aspirin-acetaminophen-caffeine (EXCEDRIN MIGRAINE) 250-250-65 MG per tablet   Yes No   Sig: Take 2 tablets by mouth every 8 (eight) hours as needed for headaches      Facility-Administered Medications: None     No Known Allergies    Objective   Vitals:Blood pressure 105/59, pulse 83, temperature (!) 96 5 °F (35 8 °C), temperature source Tympanic, resp  rate 17, height 5' 8" (1 727 m), weight 88 kg (194 lb 0 1 oz), SpO2 95 %  ,Body mass index is 29 5 kg/m²  No intake or output data in the 24 hours ending 04/19/18 1344    Invasive Devices: Invasive Devices          No matching active lines, drains, or airways        Physical Exam   Constitutional: She is oriented to person, place, and time  She appears well-developed  HENT:   Head: Normocephalic and atraumatic  Nose: Nose normal    Mouth/Throat: Oropharynx is clear and moist  No oropharyngeal exudate  Eyes: Conjunctivae and EOM are normal  Pupils are equal, round, and reactive to light  Right eye exhibits no discharge  Left eye exhibits no discharge  No scleral icterus  Cardiovascular: Normal rate and regular rhythm  No murmur heard  Pulmonary/Chest: Effort normal and breath sounds normal  No respiratory distress  She has no wheezes  Abdominal: She exhibits no distension  There is no tenderness  Musculoskeletal: Normal range of motion  She exhibits edema  Left hand soft cast in place   Neurological: She is oriented to person, place, and time  She has a normal Finger-Nose-Finger Test and a normal Heel to [employer]  Gait normal    Reflex Scores:       Tricep reflexes are 1+ on the right side and 1+ on the left side  Bicep reflexes are 1+ on the right side and 1+ on the left side  Brachioradialis reflexes are 1+ on the right side and 1+ on the left side  Patellar reflexes are 1+ on the right side and 1+ on the left side    Skin: She is not diaphoretic  Psychiatric: Her speech is normal    Vitals reviewed  Neurologic Exam     Mental Status   Oriented to person, place, and time  Follows 2 step commands  Attention: normal  Concentration: normal    Speech: speech is normal   Level of consciousness: alert  Able to perform simple calculations  Able to name object  Able to read  Able to repeat  Able to write  She is able to follow commands at times, able to tell me why she is the hospital at times - will discuss Mandaeism thoughts but is able to redirect that these are not real    She is suspicious of examiner at times and has a restricted affect  She was able to tell me year, place, current events, president, month, season, date, some history, able to spell world backwards 3/3 immediate and remote recall, she was able follow one step and complex commands  She was able to read  Cranial Nerves     CN II   Visual fields full to confrontation  CN III, IV, VI   Pupils are equal, round, and reactive to light  Extraocular motions are normal    Nystagmus: none   Diplopia: none  Ophthalmoparesis: none    CN V   Facial sensation intact  CN VII   Facial expression full, symmetric  CN VIII   CN VIII normal      CN IX, X   CN IX normal    CN X normal      CN XI   CN XI normal      CN XII   CN XII normal    Mild decrease in left nasolabia fold     Motor Exam   Muscle bulk: normal  Overall muscle tone: normal  Right arm pronator drift: absent  Left arm pronator drift: absent    Strength   Strength 5/5 except as noted  Could not test distal strength secondary to soft cast on the left hand and fingers        Sensory Exam   Light touch normal    Vibration normal      Gait, Coordination, and Reflexes     Gait  Gait: normal    Coordination   Finger to nose coordination: normal  Heel to shin coordination: normal    Tremor   Resting tremor: absent  Intention tremor: absent    Reflexes   Right brachioradialis: 1+  Left brachioradialis: 1+  Right biceps: 1+  Left biceps: 1+  Right triceps: 1+  Left triceps: 1+  Right patellar: 1+  Left patellar: 1+  Right plantar: normal  Left plantar: normal      Lab Results:     Results for orders placed or performed during the hospital encounter of 04/02/18   Comprehensive metabolic panel   Result Value Ref Range    Sodium 139 136 - 145 mmol/L    Potassium 3 7 3 5 - 5 3 mmol/L    Chloride 109 (H) 100 - 108 mmol/L    CO2 20 (L) 21 - 32 mmol/L    Anion Gap 10 4 - 13 mmol/L    BUN 20 5 - 25 mg/dL    Creatinine 0 55 (L) 0 60 - 1 30 mg/dL    Glucose 86 65 - 140 mg/dL    Glucose, Fasting 86 65 - 99 mg/dL    Calcium 8 7 mg/dL    AST 50 (H) 5 - 45 U/L    ALT 48 12 - 78 U/L    Alkaline Phosphatase 157 (H) 46 - 116 U/L    Total Protein 7 1 6 4 - 8 2 g/dL    Albumin 3 1 (L) 3 5 - 5 0 g/dL    Total Bilirubin 0 84 0 20 - 1 00 mg/dL    eGFR 104 ml/min/1 73sq m   CBC and differential   Result Value Ref Range    WBC 8 61 4 31 - 10 16 Thousand/uL    RBC 2 94 (L) 3 81 - 5 12 Million/uL    Hemoglobin 9 2 (L) 11 5 - 15 4 g/dL    Hematocrit 26 7 (L) 34 8 - 46 1 %    MCV 91 82 - 98 fL    MCH 31 3 26 8 - 34 3 pg    MCHC 34 5 31 4 - 37 4 g/dL    RDW 13 6 11 6 - 15 1 %    MPV 8 2 (L) 8 9 - 12 7 fL    Platelets 137 520 - 165 Thousands/uL    nRBC 0 /100 WBCs    Neutrophils Relative 68 43 - 75 %    Lymphocytes Relative 22 14 - 44 %    Monocytes Relative 8 4 - 12 %    Eosinophils Relative 2 0 - 6 %    Basophils Relative 0 0 - 1 %    Neutrophils Absolute 5 90 1 85 - 7 62 Thousands/µL    Lymphocytes Absolute 1 85 0 60 - 4 47 Thousands/µL    Monocytes Absolute 0 65 0 17 - 1 22 Thousand/µL    Eosinophils Absolute 0 15 0 00 - 0 61 Thousand/µL    Basophils Absolute 0 02 0 00 - 0 10 Thousands/µL   TSH, 3rd generation   Result Value Ref Range    TSH 3RD GENERATON 0 647 0 358 - 3 740 uIU/mL   CBC   Result Value Ref Range    WBC 7 12 4 31 - 10 16 Thousand/uL    RBC 2 92 (L) 3 81 - 5 12 Million/uL    Hemoglobin 9 0 (L) 11 5 - 15 4 g/dL    Hematocrit 26 9 (L) 34 8 - 46 1 %    MCV 92 82 - 98 fL    MCH 30 8 26 8 - 34 3 pg    MCHC 33 5 31 4 - 37 4 g/dL    RDW 13 4 11 6 - 15 1 %    Platelets 336 887 - 745 Thousands/uL    MPV 8 1 (L) 8 9 - 12 7 fL   Basic metabolic panel   Result Value Ref Range    Sodium 139 136 - 145 mmol/L    Potassium 3 7 3 5 - 5 3 mmol/L    Chloride 109 (H) 100 - 108 mmol/L    CO2 22 21 - 32 mmol/L    Anion Gap 8 4 - 13 mmol/L    BUN 12 5 - 25 mg/dL    Creatinine 0 61 0 60 - 1 30 mg/dL    Glucose 89 65 - 140 mg/dL    Calcium 8 7 mg/dL    eGFR 101 ml/min/1 73sq m   Antithrombin III Activity   Result Value Ref Range    AntiThrombIN III Activity 112 92 - 136 % of Normal   Cardiolipin antibody   Result Value Ref Range    Anticardiolipin IgA <9 0 - 11 APL U/mL    Anticardiolipin IgG <9 0 - 14 GPL U/mL    Anticardiolipin IgM <9 0 - 12 MPL U/mL   Factor 5 leiden   Result Value Ref Range    Factor V Leiden Comment    Lupus anticoagulant   Result Value Ref Range    PTT Lupus Anticoagulant 39 0 0 0 - 51 9 sec    Dilute Viper Venom Time 41 2 0 0 - 47 0 sec    DILUTE PROTHROMBIN TIME(DPT) 44 4 0 0 - 55 0 sec    THROMBIN TIME (DRVW) 18 6 0 0 - 23 0 sec    DPT CONFIRM RATIO 0 84 0 00 - 1 40 Ratio    LUPUS REFLEX INTERPRETATION Comment:    Protein C activity   Result Value Ref Range    Protein C Activity 106 60 - 150 % of Normal   Protein S activity   Result Value Ref Range    Protein S Activity 90 63 - 140 %   Protein S Antigen, Total & Free   Result Value Ref Range    Protein S Ag, Total 92 60 - 150 %    Protein S Ag, Free 106 57 - 157 %   Prothrombin gene mutation   Result Value Ref Range    Prothrombin Mutation Comment    Beta-2 glycoprotein antibodies   Result Value Ref Range    Beta-2 Glyco 1 IgG <9 0 - 20 GPI IgG units    Beta-2 Glyco 1 IgA <9 0 - 25 GPI IgA units    Beta-2 Glyco 1 IgM <9 0 - 32 GPI IgM units   CBC and differential   Result Value Ref Range    WBC 5 33 4 31 - 10 16 Thousand/uL    RBC 2 91 (L) 3 81 - 5 12 Million/uL    Hemoglobin 8 9 (L) 11 5 - 15 4 g/dL    Hematocrit 27 6 (L) 34 8 - 46 1 %    MCV 95 82 - 98 fL    MCH 30 6 26 8 - 34 3 pg    MCHC 32 2 31 4 - 37 4 g/dL    RDW 13 7 11 6 - 15 1 %    MPV 8 3 (L) 8 9 - 12 7 fL    Platelets 297 (H) 845 - 390 Thousands/uL    nRBC 0 /100 WBCs    Neutrophils Relative 47 43 - 75 %    Lymphocytes Relative 43 14 - 44 %    Monocytes Relative 6 4 - 12 %    Eosinophils Relative 3 0 - 6 %    Basophils Relative 1 0 - 1 %    Neutrophils Absolute 2 48 1 85 - 7 62 Thousands/µL    Lymphocytes Absolute 2 30 0 60 - 4 47 Thousands/µL    Monocytes Absolute 0 31 0 17 - 1 22 Thousand/µL    Eosinophils Absolute 0 17 0 00 - 0 61 Thousand/µL    Basophils Absolute 0 05 0 00 - 0 10 Thousands/µL   RPR   Result Value Ref Range    RPR Non-Reactive Non-Reactive   Hepatic function panel   Result Value Ref Range    Total Bilirubin 0 34 0 20 - 1 00 mg/dL    Bilirubin, Direct 0 10 0 00 - 0 20 mg/dL    Alkaline Phosphatase 122 (H) 46 - 116 U/L    AST 17 5 - 45 U/L    ALT 23 12 - 78 U/L    Total Protein 6 6 6 4 - 8 2 g/dL    Albumin 2 7 (L) 3 5 - 5 0 g/dL   CBC and differential   Result Value Ref Range    WBC 5 53 4 31 - 10 16 Thousand/uL    RBC 2 94 (L) 3 81 - 5 12 Million/uL    Hemoglobin 9 1 (L) 11 5 - 15 4 g/dL    Hematocrit 28 1 (L) 34 8 - 46 1 %    MCV 96 82 - 98 fL    MCH 31 0 26 8 - 34 3 pg    MCHC 32 4 31 4 - 37 4 g/dL    RDW 13 9 11 6 - 15 1 %    MPV 8 0 (L) 8 9 - 12 7 fL    Platelets 822 (H) 787 - 390 Thousands/uL    nRBC 0 /100 WBCs    Neutrophils Relative 47 43 - 75 %    Lymphocytes Relative 40 14 - 44 %    Monocytes Relative 10 4 - 12 %    Eosinophils Relative 2 0 - 6 %    Basophils Relative 1 0 - 1 %    Neutrophils Absolute 2 59 1 85 - 7 62 Thousands/µL    Lymphocytes Absolute 2 20 0 60 - 4 47 Thousands/µL    Monocytes Absolute 0 56 0 17 - 1 22 Thousand/µL    Eosinophils Absolute 0 13 0 00 - 0 61 Thousand/µL    Basophils Absolute 0 04 0 00 - 0 10 Thousands/µL   ECG 12 lead   Result Value Ref Range    Ventricular Rate 95 BPM    Atrial Rate 95 BPM    MN Interval 154 ms    QRSD Interval 76 ms    QT Interval 348 ms    QTC Interval 437 ms    P Axis 52 degrees    QRS Axis 23 degrees    T Wave [place] 19 degrees     Imaging Studies:     Per radiology interpretation -    "  Xr Chest Portable    Result Date: 3/30/2018  Narrative: CHEST INDICATION:   ETT placement     Evaluate bilateral knees and bilateral feet because of bruising and swelling post traumatic event, eval left hand for fracture after self amputation of left 4th digit, and eval ETT placement COMPARISON:  None EXAM PERFORMED/VIEWS:  XR CHEST PORTABLE FINDINGS:  Endotracheal tube is present, in satisfactory position with its tip above the level of the fang  Enteric tube is present with its tip extending below the left hemidiaphragm  Heart shadow appears unremarkable  Atherosclerotic vascular calcifications are noted  The lungs are clear  No pneumothorax or pleural effusion  Osseous structures appear within normal limits for patient age  Impression: No acute cardiopulmonary disease  Workstation performed: [id number]     Xr Hand 3+ Vw Left    Result Date: 3/30/2018  Narrative: LEFT HAND INDICATION:   trauma  santiago bilateral knees and bilateral feet because of bruising and swelling post traumatic event, eval left hand for fracture after self amputation of left 4th digit, and eval ETT placement COMPARISON:  None VIEWS:  XR HAND 3+ VW LEFT For the purposes of institution wide universal language the following terms will apply: (thumb=1st digit/finger, index finger=2nd digit/finger, long finger=3rd digit/finger, ring=4th digit/finger and small finger=5th digit/finger) FINDINGS: There has been amputation of the 4th digit, at the level of the mid diaphysis of the proximal phalanx  No retained foreign body  A bandage is noted in place  No significant degenerative changes  No lytic or blastic lesions are seen       Impression: Status post amputation of the 4th digit at the level of the mid diaphysis of the proximal phalanx  Workstation performed: [id number]     Xr Knee 1 Or 2 Vw Left    Result Date: 3/30/2018  Narrative: LEFT KNEE INDICATION:   trauma  Evaluate bilateral knees and bilateral feet because of bruising and swelling post traumatic event, eval left hand for fracture after self amputation of left 4th digit, and eval ETT placement COMPARISON:  None VIEWS:  XR KNEE 1 OR 2 VW LEFT FINDINGS: There is no acute fracture or dislocation  There is no joint effusion  No significant degenerative changes  No lytic or blastic lesions are seen  Soft tissues are unremarkable  Impression: No acute osseous abnormality  Workstation performed: [id number]     Xr Knee 1 Or 2 Vw Right    Result Date: 3/30/2018  Narrative: RIGHT KNEE INDICATION:   trauma  Evaluate bilateral knees and bilateral feet because of bruising and swelling post traumatic event, eval left hand for fracture after self amputation of left 4th digit, and eval ETT placement COMPARISON:  None VIEWS:  XR KNEE 1 OR 2 VW RIGHT FINDINGS: There is no acute fracture or dislocation  There is no joint effusion  Mild osteoarthritis with small osteophytes seen  No lytic or blastic lesions are seen  Soft tissues are unremarkable  Impression: 1  No acute osseous abnormality  2   Degenerative changes as described  Workstation performed: [id number]     Xr Foot 3+ Vw Left    Result Date: 3/30/2018  Narrative: LEFT FOOT INDICATION:   ecchymosis  Evaluate bilateral knees and bilateral feet because of bruising and swelling post traumatic event, eval left hand for fracture after self amputation of left 4th digit, and eval ETT placement COMPARISON:  None VIEWS:  XR FOOT 3+ VW LEFT FINDINGS: There is no acute fracture or dislocation  Hallux valgus deformity noted  Small plantar these finding noted arising off of the calcaneal origin of the plantar tendon  No lytic or blastic lesions seen  Soft tissues are unremarkable       Impression: No acute osseous abnormality  Degenerative changes as described  Workstation performed: [id number]     Xr Foot 3+ Vw Right    Result Date: 3/30/2018  Narrative: RIGHT FOOT INDICATION:   ecchymosis  Evaluate bilateral knees and bilateral feet because of bruising and swelling post traumatic event, eval left hand for fracture after self amputation of left 4th digit, and eval ETT placement COMPARISON:  None VIEWS:  XR FOOT 3+ VW RIGHT FINDINGS: There is no acute fracture or dislocation  Calcaneal spur(s) noted  No lytic or blastic lesions seen  Soft tissues are unremarkable  Impression: No acute osseous abnormality  Workstation performed: [id number]     Trauma - Ct Head Wo Contrast    Result Date: 3/30/2018  Narrative: CT BRAIN - WITHOUT CONTRAST INDICATION:   Stab wound    COMPARISON:  None  TECHNIQUE:  CT examination of the brain was performed  In addition to axial images, coronal 2D reformatted images were created and submitted for interpretation  Radiation dose length product (DLP) for this visit:   This examination, like all CT scans performed in the [facility], was performed utilizing techniques to minimize radiation dose exposure, including the use of iterative reconstruction  and automated exposure control  IMAGE QUALITY:  Diagnostic  FINDINGS: PARENCHYMA:  No intracranial mass, mass effect or midline shift  No CT signs of acute infarction  No acute intracranial hemorrhage  VENTRICLES AND EXTRA-AXIAL SPACES:  Normal for the patient's age  VISUALIZED ORBITS AND PARANASAL SINUSES:  Bilateral maxillary sinus air-fluid levels are seen  CALVARIUM AND EXTRACRANIAL SOFT TISSUES:  Multiple lacerations are seen over the calvarium  Impression: No acute intracranial abnormality  Bilateral maxillary sinus air-fluid levels suggesting acute sinusitis   Workstation performed: [id number]     Trauma - Ct Spine Cervical Wo Contrast    Result Date: 3/30/2018  Narrative: CT CERVICAL SPINE - WITHOUT CONTRAST INDICATION:   Stab wounds    COMPARISON: None  TECHNIQUE:  CT examination of the cervical spine was performed without intravenous contrast   Contiguous axial images were obtained  Sagittal and coronal reconstructions were performed  Radiation dose length product (DLP) for this visit:   This examination, like all CT scans performed in the [place], was performed utilizing techniques to minimize radiation dose exposure, including the use of iterative reconstruction  and automated exposure control  IMAGE QUALITY:  Diagnostic  FINDINGS: ALIGNMENT:  Normal alignment of the cervical spine  No subluxation  VERTEBRAL BODIES:  No fracture  DEGENERATIVE CHANGES:  Mild multilevel cervical degenerative changes are noted without critical central canal stenosis  PREVERTEBRAL AND PARASPINAL SOFT TISSUES:  Endotracheal and nasogastric tubes are present  THORACIC INLET:  Normal      Impression: No cervical spine fracture or traumatic malalignment  Workstation performed: [email or web address]     Xr Chest 1 View    Result Date: 4/2/2018  Narrative: TRAUMA SERIES INDICATION:  Injury  COMPARISON:  Subsequent CT performed 20 minutes later  VIEWS:  Supine radiograph of chest 1 image  FINDINGS: CHEST: Supine frontal view of the chest is obtained  Large portions of the lateral right lung have been excluded from the x-ray  Lung apices are also not imaged in their entirety  Visualized lung fields appear clear  No consolidation or significant effusion visible on supine film  NG tube in the stomach, tip is not seen  Endotracheal tube tip 1 5 cm above fagn (subsequent CT shows the endotracheal tube tip 2 5 cm above fang)  No pneumothorax is seen on this supine film  Upright images are more sensitive to detect anterior pneumothoraces if relevant  No displaced fractures  Numerous EKG leads       Impression: Very limited exam due to technique/positioning and exclusion of portions of the lateral right lung and bilateral lung donell  A subsequent chest CT was performed - Endotracheal tube on the subsequent study is shown to be 2 5 cm above fang  No obvious  visceral or osseous injury in the visualized portions of lung/thorax  Workstation performed: [id number]     Trauma - Ct Chest Abdomen Pelvis W Contrast    Result Date: 3/30/2018  Narrative: CT CHEST, ABDOMEN AND PELVIS WITH IV CONTRAST INDICATION:   Stab wound    COMPARISON: None  TECHNIQUE: CT examination of the chest, abdomen and pelvis was performed  Axial, sagittal, and coronal 2D reformatted images were created from the source data and submitted for interpretation  Radiation dose length product (DLP) for this visit:  1269 6 mGy-cm   This examination, like all CT scans performed in the [facility], was performed utilizing techniques to minimize radiation dose exposure, including the use of iterative  reconstruction and automated exposure control  IV Contrast:  100 mL of iohexol (OMNIPAQUE) Enteric Contrast: Enteric contrast was administered  FINDINGS: CHEST LUNGS:  Lungs are clear  There is no tracheal or endobronchial lesion  PLEURA:  Unremarkable  HEART/GREAT VESSELS:  Unremarkable for patient's age  MEDIASTINUM AND NATALIE:  Endotracheal tube is present in appropriate position  CHEST WALL AND LOWER NECK:   Unremarkable  ABDOMEN LIVER/BILIARY TREE:  Unremarkable  GALLBLADDER:  No calcified gallstones  No pericholecystic inflammatory change  SPLEEN:  Unremarkable  PANCREAS:  Unremarkable  ADRENAL GLANDS:  Unremarkable  KIDNEYS/URETERS:  One or more simple renal cyst(s) is noted  Otherwise unremarkable kidneys  No hydronephrosis  STOMACH AND BOWEL:  Nasogastric tube tip is within the stomach  APPENDIX:  No findings to suggest appendicitis  ABDOMINOPELVIC CAVITY:  No ascites or free intraperitoneal air  No lymphadenopathy  VESSELS:  Unremarkable for patient's age  PELVIS REPRODUCTIVE ORGANS:  Unremarkable for patient's age   URINARY BLADDER:  Winston catheter is present  ABDOMINAL WALL/INGUINAL REGIONS:  Unremarkable  OSSEOUS STRUCTURES:  No acute fracture or destructive osseous lesion  Impression: No traumatic abnormality identified  Workstation performed: [id number]     Xr Trauma Multiple    Result Date: 3/30/2018  Narrative: TRAUMA SERIES INDICATION:  Injury  COMPARISON:  Subsequent CT performed 20 minutes later  VIEWS:  Supine radiograph of chest 1 image  FINDINGS: CHEST: Supine frontal view of the chest is obtained  Large portions of the lateral right lung have been excluded from the x-ray  Lung apices are also not imaged in their entirety  Visualized lung fields appear clear  No consolidation or significant effusion visible on supine film  NG tube in the stomach, tip is not seen  Endotracheal tube tip 1 5 cm above fang (subsequent CT shows the endotracheal tube tip 2 5 cm above fang)  No pneumothorax is seen on this supine film  Upright images are more sensitive to detect anterior pneumothoraces if relevant  No displaced fractures  Numerous EKG leads  Impression: Very limited exam due to technique/positioning and exclusion of portions of the lateral right lung and bilateral lung apices  A subsequent chest CT was performed - Endotracheal tube on the subsequent study is shown to be 2 5 cm above fang  No obvious  visceral or osseous injury in the visualized portions of lung/thorax  Workstation performed: [id number]     Vas Lower Limb Venous Duplex Study, Complete Bilateral    Result Date: 4/12/2018  Narrative:  [facility] REPORT CLINICAL: Indications: Patient presents with right lower extremity pain and swelling x 6 days  Risk Factors:  Patient with multiple self inflicted injuries  Clinical:  Right Lower Limb:  There is complaint of pain and tenderness     FINDINGS:  Segment     Right                   Left                    Impression              Impression       CFV         Normal (Patent)         Normal (Patent)  Popliteal   Occlusive Subsegmental                   PostTibial  Occlusive Subsegmental                   Peroneal    Occlusive Subsegmental                   Calf Veins  Occlusive Subsegmental                      CONCLUSION: Impression: RIGHT LOWER LIMB: Evaluation shows evidence of acute occlusive deep vein thrombosis in the posterior tibial veins, peroneal veins extending into the popliteal vein including a soleal vein  No evidence of superficial thrombophlebitis noted  Popliteal, posterior tibial and anterior tibial arterial Doppler waveforms are triphasic  LEFT LOWER LIMB: No evidence of acute or chronic deep vein thrombosis  No evidence of superficial thrombophlebitis noted  Doppler evaluation shows a normal response to augmentation maneuvers  Popliteal, posterior tibial and anterior tibial arterial Doppler waveforms are triphasic  Technical findings were given to patient's RN, Wilmer Cary at 14:24  SIGNATURE: Electronically Signed by: Jake Correa MD, [address] on 2018-04-12 08:57:55 PM    Code Status: Level 1 - Full Code    Counseling / Coordination of Care  Total time spent today 50 minutes  Greater than 50% of total time was spent with the patient and / or family counseling and / or coordination of care   A description of the counseling / coordination of care: floor time, reviewing records, examing patient

## 2018-04-19 NOTE — PROGRESS NOTES
Alessandra Son, an investigator from [address] arrived at the unit requesting to speak with Pt  After speaking to Ms Finnegan Pt agreed to meet with Ms Angela Mclaughlin in conference room  Nurse was present at meeting at request of Pt  After becoming comfortable with investigator nurse's presence was no longer required

## 2018-04-19 NOTE — CASE MANAGEMENT
[address] [place] [address] Dr zaidi [phone] o follow-up on new client referral completed and faxed 4/18/18 and voicemail message left; awaiting call back

## 2018-04-20 LAB — VIT B12 SERPL-MCNC: 597 PG/ML (ref 100–900)

## 2018-04-20 PROCEDURE — 86038 ANTINUCLEAR ANTIBODIES: CPT | Performed by: PHYSICIAN ASSISTANT

## 2018-04-20 PROCEDURE — 82607 VITAMIN B-12: CPT | Performed by: PHYSICIAN ASSISTANT

## 2018-04-20 PROCEDURE — 99232 SBSQ HOSP IP/OBS MODERATE 35: CPT | Performed by: PSYCHIATRY & NEUROLOGY

## 2018-04-20 RX ADMIN — ENOXAPARIN SODIUM 135 MG: 150 INJECTION SUBCUTANEOUS at 21:33

## 2018-04-20 RX ADMIN — HALOPERIDOL 10 MG: 5 TABLET ORAL at 16:00

## 2018-04-20 RX ADMIN — HALOPERIDOL 10 MG: 5 TABLET ORAL at 08:29
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
## 2018-04-20 NOTE — PROGRESS NOTES
Patient showered this Am, she was compliant with AM medication  Denies any currrent suicidal thoughts, denies any current hallucinations  Can be seclusive to self and room at times

## 2018-04-20 NOTE — PROGRESS NOTES
I called MRI to find out what time patient is to go for her MRI  Awaiting return call  patient is currently eating lunch in dayroom  She is alert and answering questions appropriately  she denies any current suicidal thoughts

## 2018-04-20 NOTE — PROGRESS NOTES
Progress Note - Behavioral Health   Stuart Stephenson 63 y o  female MRN: @MRN   Unit/Bed#: VQ4 564-17 Encounter: [id number]        The patient was evaluated with a medical student to follow up on the patient's progress and remaining psychiatric symptoms for continuation of care in a [facility] hospital  Pt is feeling better, has some improvement in insight and judgment  Denied acute auditory hallucinations, denied thoughts of self-harm  Her progress and medication management was discussed  Pt did not have any questions regarding her medication management  ** Please Note: This note has been constructed using a voice recognition system   **

## 2018-04-20 NOTE — PROGRESS NOTES
Spoke with the MRI dept in regards to pt's scheduled MRI  Was told that they are scheduled until 10pm tonight, and other emergent cases could be coming in  MRI staff assured that pt's MRI would be completed this weekend

## 2018-04-20 NOTE — PROGRESS NOTES
Pt is cooperative and medication compliant this evening  Pt is present in the milieu, ambulates the ernandez and is out for meals  Pt currently denies s/s  Will continue to monitor

## 2018-04-20 NOTE — PLAN OF CARE
Problem: DISCHARGE PLANNING  Goal: Discharge to home or other facility with appropriate resources  INTERVENTIONS:  - Identify barriers to discharge w/patient and caregiver  - Arrange for needed discharge resources and transportation as appropriate  - Identify discharge learning needs (meds, wound care, etc )  - Arrange for interpretive services to assist at discharge as needed  - Refer to Case Management Department for coordinating discharge planning if the patient needs post-hospital services based on physician/advanced practitioner order or complex needs related to functional status, cognitive ability, or social support system   Outcome: Progressing  Discharge plan is for Pt to return home to live independently and Pt's friend [place] (CREEK) [facility] will provide discharge transport  Pt will arrange for discharge transport with [facility]  Outpatient PCP and Psychiatric Referrals/appointments scheduled in preparation for discharge

## 2018-04-20 NOTE — CASE MANAGEMENT
Pt signed release of information 4/19/18 with RN for Adult Protective Services prior to meeting with Loulou Pierre (Adult Protective Services Investigator) on 4/19/18  This writer contacted both Loulou Pierre @ [phone] and Jac Gregorio @ [phone] (both Adult Protective Services Investigators with [facility] [employer] contracted with [place] Department of [employer]) and left voicemail with both to follow-up on visit 4/19/18 as case management was not present at time of this visit  Received call back from Loulou Pierre and Adult HALIMA Escudero received an anonymous call concerning Pt's self-neglect prior to admission and marked change in her behavior prior to admission and investigator was following up on the call and requested and was provided with H&P and med list for Pt (Pt signed release of information for these records to be released at time of [facility] visit on 4/19/18)  Tamara Morton reports that investigator assigned to Pt is Jac Gregorio and Mercedes Rodriguez may contact case management to discuss Pt's discharge plan and follow-up treatment  Met with Pt and discussed outpatient PCP and [facility] intake appointments and Pt states she can only schedule appointments on Monday or Friday as 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, Wednesday, Thursday at the [employer]  Contacted [facility] and rescheduled appointment for 5/7/18 @ 10:00 am  Message left with [facility] to reschedule appointment from 5/3/18 (Thursday) and awaiting call back  Instructed Pt to contact [facility] after discharge to reschedule to Monday or Friday if [facility] does not return call and Pt's appointment cannot be rescheduled prior to her discharge     Discussed with Pt the importance of continued medication compliance to avoid relapse of psychosis and importance of continued psychiatric treatment after discharge at [facility] and Pt verbalized understanding and states she will continue to take medication and will comply with follow-up with PCP and [facility]  Pt's friend Courtney Marcus) is planning to visit this weekend and bring clothing and personal items to Pt  Pt states that [facility] will provide discharge transport for her to return home

## 2018-04-21 ENCOUNTER — APPOINTMENT (INPATIENT)
Dept: RADIOLOGY | Facility: HOSPITAL | Age: 58
DRG: 885 | End: 2018-04-21
Payer: COMMERCIAL

## 2018-04-21 PROCEDURE — 70553 MRI BRAIN STEM W/O & W/DYE: CPT

## 2018-04-21 PROCEDURE — 99232 SBSQ HOSP IP/OBS MODERATE 35: CPT | Performed by: PSYCHIATRY & NEUROLOGY

## 2018-04-21 PROCEDURE — A9585 GADOBUTROL INJECTION: HCPCS | Performed by: PHYSICIAN ASSISTANT

## 2018-04-21 RX ADMIN — HALOPERIDOL 10 MG: 5 TABLET ORAL at 16:14

## 2018-04-21 RX ADMIN — HALOPERIDOL 10 MG: 5 TABLET ORAL at 08:18

## 2018-04-21 RX ADMIN — ENOXAPARIN SODIUM 135 MG: 150 INJECTION SUBCUTANEOUS at 21:10

## 2018-04-21 RX ADMIN — GADOBUTROL 9 ML: 604.72 INJECTION INTRAVENOUS at 09:34
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
## 2018-04-21 NOTE — PROGRESS NOTES
Pt brightens on approach  Eye contact has improved and she is talking more  Visible in milieu for most of the day  Limited interaction with peers  Denies s/s  Med compliant   Will continue to monitor

## 2018-04-21 NOTE — PROGRESS NOTES
MRI brain w/w/o contrast which I personally reviewed is negative for any structural pathology  EEG can be done outpatient as she has scab wounds on her head per nursing staff  At this time, low suspicion for potential neurologic etiology  Will sign off  Please call us back with any questions/concerns

## 2018-04-22 ENCOUNTER — APPOINTMENT (INPATIENT)
Dept: RADIOLOGY | Facility: HOSPITAL | Age: 58
DRG: 885 | End: 2018-04-22
Payer: COMMERCIAL

## 2018-04-22 PROBLEM — G25.1 DRUG-INDUCED TREMOR: Status: ACTIVE | Noted: 2018-04-22

## 2018-04-22 PROBLEM — M79.89 SWELLING OF RIGHT LOWER EXTREMITY: Status: RESOLVED | Noted: 2018-04-10 | Resolved: 2018-04-22

## 2018-04-22 PROBLEM — F29 PSYCHOSIS, ATYPICAL (HCC): Status: ACTIVE | Noted: 2018-03-31

## 2018-04-22 PROCEDURE — 73130 X-RAY EXAM OF HAND: CPT

## 2018-04-22 PROCEDURE — 99232 SBSQ HOSP IP/OBS MODERATE 35: CPT | Performed by: PSYCHIATRY & NEUROLOGY

## 2018-04-22 RX ORDER — BENZTROPINE MESYLATE 1 MG/1
1 TABLET ORAL 2 TIMES DAILY
Status: DISCONTINUED | OUTPATIENT
Start: 2018-04-22 | End: 2018-04-23 | Stop reason: HOSPADM

## 2018-04-22 RX ADMIN — HALOPERIDOL 10 MG: 5 TABLET ORAL at 17:37

## 2018-04-22 RX ADMIN — BENZTROPINE MESYLATE 1 MG: 1 TABLET ORAL at 17:38

## 2018-04-22 RX ADMIN — HALOPERIDOL 10 MG: 5 TABLET ORAL at 08:31

## 2018-04-22 RX ADMIN — BENZTROPINE MESYLATE 1 MG: 1 TABLET ORAL at 10:07

## 2018-04-22 RX ADMIN — ENOXAPARIN SODIUM 135 MG: 150 INJECTION SUBCUTANEOUS at 21:03

## 2018-04-22 NOTE — PROGRESS NOTES
Pt is bright and social with peers and staff  Visible in milieu  Denies s/s  Med compliant  Taken by po

## 2018-04-22 NOTE — PROGRESS NOTES
Progress Note - Behavioral Health   Josselyn Stephenson 63 y o  female MRN: [id number]  Unit/Bed#: GH0 515-85 Encounter: [id number]    The patient was seen for continuing care and reviewed with treatment team   She reports progressive improvement of her mood and resolution of hallucinations  She is no longer delusional about her abilities to communicate with God  She is focused on her future and how to get back into swing of things  She has decided to stay with her friend for the first week after discharge  Yesterday she started having fine tremor of both hands  Currently she does not have any overt EPS    Mental Status Evaluation:  Appearance:  Adequate hygiene and grooming and Good eye contact   Behavior:  calm and cooperative   Fund of knowledge  aware of current events   Speech:   Language: Normal rate and Normal volume  No overt abnormality   Mood:  euthymic   Affect:   Associations: appropriate and mood-congruent  Tightly connected   Thought Process:  Goal directed and coherent   Thought Content:  Does not verbalize delusional material   Perceptual Disturbances: Denies hallucinations and does not appear to be responding to internal stimuli   Risk Potential: No suicidal or homicidal ideation   Orientation  Oriented x 3   Memory grossly intact   Attention/Concentration attention span and concentration were age appropriate   Insight:  Good insight   Judgment: Good judgment   Gait/Station: normal gait/station and normal balance   Motor Activity: No abnormal movement noted     Progress Toward Goals:  Continues to improve    Assessment/Plan    Principal Problem:    Psychosis, atypical  Active Problems:    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    Suicidal behavior with attempted self-injury ([facility] )    Drug-induced tremor      Recommended Treatment: We will add benztropine to alleviate tremor and mild EPS     If she remains stable, she will be discharged tomorrow Continue with pharmacotherapy, group therapy, milieu therapy and occupational therapy  The patient will be maintained on the following medications:    Current Facility-Administered Medications:  acetaminophen 650 mg Oral Q6H PRN Joseph Deshpande, MD   bacitracin 1 large application Topical BID PRN Rosi Hull MD   benztropine 1 mg Oral BID Joseph Deshpande, MD   enoxaparin 1 5 mg/kg Subcutaneous Q24H [facility] Anthony Davis MD   haloperidol 10 mg Oral BID Joseph Conception, MD   haloperidol 5 mg Oral Q8H PRN Joseph Conception, MD   haloperidol lactate 5 mg Intramuscular Q8H PRN Joseph Conception, MD   ibuprofen 400 mg Oral Q8H PRN Joseph Charlee, MD   LORazepam 1 mg Intramuscular Q6H PRN Joseph Charlee, MD   LORazepam 1 mg Oral Q6H PRN Joseph Conception, MD   OLANZapine 10 mg Intramuscular Q8H PRN Joseph Charlee, MD   risperiDONE 1 mg Oral Q8H PRN Josephiris Deshpande, MD   sterile water       sterile water       sterile water       sterile water       sterile water       sterile water       sterile water       sterile water       traMADol 50 mg Oral Q6H PRN Josephiris Deshpande, MD   zolpidem 5 mg Oral HS PRN Joseph Deshpande MD       Risks, benefits and possible side effects of Medications:   Risks, benefits, and possible side effects of medications explained to patient and patient verbalizes understanding

## 2018-04-23 VITALS
RESPIRATION RATE: 16 BRPM | SYSTOLIC BLOOD PRESSURE: 121 MMHG | DIASTOLIC BLOOD PRESSURE: 63 MMHG | TEMPERATURE: 97.7 F | HEART RATE: 73 BPM | HEIGHT: 68 IN | OXYGEN SATURATION: 97 % | BODY MASS INDEX: 29.4 KG/M2 | WEIGHT: 194 LBS

## 2018-04-23 LAB — RYE IGE QN: NEGATIVE

## 2018-04-23 PROCEDURE — 99239 HOSP IP/OBS DSCHRG MGMT >30: CPT | Performed by: PSYCHIATRY & NEUROLOGY

## 2018-04-23 RX ORDER — HALOPERIDOL 10 MG/1
10 TABLET ORAL 2 TIMES DAILY
Qty: 60 TABLET | Refills: 1 | Status: SHIPPED | OUTPATIENT
Start: 2018-04-23 | End: 2018-10-02 | Stop reason: ALTCHOICE

## 2018-04-23 RX ORDER — BENZTROPINE MESYLATE 1 MG/1
1 TABLET ORAL 2 TIMES DAILY
Qty: 60 TABLET | Refills: 0 | Status: SHIPPED | OUTPATIENT
Start: 2018-04-23 | End: 2018-10-02 | Stop reason: ALTCHOICE

## 2018-04-23 RX ORDER — ASPIRIN 81 MG/1
81 TABLET ORAL DAILY
Qty: 30 TABLET | Refills: 0
Start: 2018-04-23 | End: 2018-10-02 | Stop reason: ALTCHOICE

## 2018-04-23 RX ADMIN — BENZTROPINE MESYLATE 1 MG: 1 TABLET ORAL at 09:50

## 2018-04-23 RX ADMIN — HALOPERIDOL 10 MG: 5 TABLET ORAL at 09:51

## 2018-04-23 NOTE — PLAN OF CARE
Problem: Ineffective Coping  Goal: Participates in unit activities  Interventions:  - Provide therapeutic environment   - Provide required programming   - Redirect inappropriate behaviors    Outcome: Adequate for Discharge  Pt  Independently completed a relapse prevention plan and spontaneously engaged in community meeting without prompting  Scheduled for tentative discharge today

## 2018-04-23 NOTE — PROGRESS NOTES
Pt visible in the milieu bright and social with peers and staff  Continues to be med compliant via mouth  Participated in group  Denies all s/s this evening  Will continue to monitor

## 2018-04-23 NOTE — DISCHARGE INSTRUCTIONS
Haloperidol (By mouth)   Haloperidol (naomi-oh-PER-i-dol)  Treats symptoms of mental and emotional disorders and severe behavior problems  Controls symptoms of Tourette syndrome  Brand Name(s):   There may be other brand names for this medicine  When This Medicine Should Not Be Used: You should not use this medicine if you have had an allergic reaction to haloperidol, or if you have Parkinson disease  This medicine should not be given to patients with severe brain disease  How to Use This Medicine:   Tablet  · Take your medicine as directed  Your dose may need to be changed several times to find what works best for you  If a dose is missed:   · Take a dose as soon as you remember  If it is almost time for your next dose, wait until then and take a regular dose  Do not take extra medicine to make up for a missed dose  How to Store and Dispose of This Medicine:   · Store the medicine in a closed container at room temperature, away from heat, moisture, and direct light  · Ask your pharmacist, doctor, or health caregiver about the best way to dispose of any outdated medicine or medicine no longer needed  · Keep all medicine out of the reach of children  Never share your medicine with anyone  Drugs and Foods to Avoid:   Ask your doctor or pharmacist before using any other medicine, including over-the-counter medicines, vitamins, and herbal products  · You must be careful if you are also using other medicine that might cause similar side effects as haloperidol  This includes medicine that might cause low blood pressure, overheating, or liver problems  Make sure your doctor knows about all other medicines you are using  · Tell you doctor if you are also using lithium, phenindione, medicine for seizures, or medicine to treat Parkinson's disease  · Tell your doctor if you use anything else that makes you sleepy  Some examples are allergy medicine, narcotic pain medicine, and alcohol    · Do not drink alcohol while you are using this medicine  Warnings While Using This Medicine:   · Make sure your doctor knows if you are pregnant or breastfeeding, or if you have liver disease, kidney disease, heart or blood vessel disease, blood pressure problems, overactive thyroid, or history of seizures or breast cancer  · Tell your doctor about any other medicine you have used to treat a mental disorder, especially if the medicine caused problems  · This medicine may make you dizzy or drowsy  Avoid driving, using machines, or doing anything else that could be dangerous if you are not alert  · You might get overheated more easily while using this medicine  Be aware of this if you are exercising or the weather is hot  Drinking water might help  If you get too hot and feel dizzy, weak, tired, confused, or sick to your stomach, you need to cool down  · Do not stop using this medicine suddenly  Your doctor will need to slowly decrease your dose before you stop it completely  · Your doctor will check your progress and the effects of this medicine at regular visits  Keep all appointments  Possible Side Effects While Using This Medicine:   Call your doctor right away if you notice any of these side effects:  · Allergic reaction: Itching or hives, swelling in your face or hands, swelling or tingling in your mouth or throat, chest tightness, trouble breathing  · Change in how much or how often you urinate  · Changes in vision  · Fast or pounding heartbeat  · Fever or chills  · Jerky muscle movement you cannot control (often in your face, tongue, or jaw)  · Headache, confusion, lightheadedness, or fainting  · Painful, prolonged erection of your penis  · Problems with balance or walking  · Seeing or hearing things which are not there  · Seizures or tremors  · Severe muscle stiffness  · Troubled breathing  · Unusual bleeding, bruising, or weakness  · Yellowing of skin and eyes    If you notice these less serious side effects, talk with your doctor:   · Breast pain or swelling  · Change in menstrual periods  · Decreased thirst or dry mouth  · Loss of appetite  · Nausea, vomiting, diarrhea, or constipation  · Skin rash  · Trouble sleeping, restlessness  If you notice other side effects that you think are caused by this medicine, tell your doctor  Call your doctor for medical advice about side effects  You may report side effects to FDA at 8-279-FDA-3063  © 2017 [employer] Information is for End User's use only and may not be sold, redistributed or otherwise used for commercial purposes  The above information is an  only  It is not intended as medical advice for individual conditions or treatments  Talk to your doctor, nurse or pharmacist before following any medical regimen to see if it is safe and effective for you  Aspirin (By mouth)   Aspirin (AS-pir-in)  Treats pain, fever, and inflammation  May lower risk of heart attack and stroke  Brand Name(s): Ascriptin Regular Strength, Aspergum, Aspir Low, Aspirin Adult Low Dose, Aspirin Low Dose, Cyril Aspirin Children's, Cyril Aspirin Regimen, Cyril Extra Strength, Cyril Genuine Aspirin, Bufferin, Bufferin Low Dose, Durlaza, Ecotrin, Ecpirin, Enteric Aspirin   There may be other brand names for this medicine  When This Medicine Should Not Be Used: This medicine is not right for everyone  Do not use it if you had an allergic reaction to aspirin or other NSAIDs, or if you have a history of asthma with nasal polyps and rhinitis  How to Use This Medicine:   Delayed Release Capsule, Long Acting Capsule, Gum, Tablet, Chewable Tablet, Fizzy Tablet, Coated Tablet, Long Acting Tablet, 24 Hour Capsule  · Your doctor will tell you how much medicine to use  Do not use more than directed  · It is best to take this medicine with food or milk  · Capsule, tablet, or coated tablet: Swallow whole  Do not crush, break, or chew it    · Chewable tablet: You may chew it completely or swallow it whole  · Gum: Chew completely to make sure you get as much medicine as possible  Drink a full glass (8 ounces) of water after chewing the gum  · Swallow the extended-release capsule whole  Do not crush, break, or chew it  Take the capsule with a full glass of water at the same time each day  · Follow the instructions on the medicine label if you are using this medicine without a prescription  · Missed dose: If you miss a dose of Durlaza, skip the missed dose and go back to your regular dosing schedule  Do not take extra medicine to make up for a missed dose  · Store the medicine in a closed container at room temperature, away from heat, moisture, and direct light  Drugs and Foods to Avoid:   Ask your doctor or pharmacist before using any other medicine, including over-the-counter medicines, vitamins, and herbal products  · Some foods and medicines can affect how aspirin works  Tell your doctor if you are using any of the following:  ¨ Dipyridamole, methotrexate, probenecid, sulfinpyrazone, ticlopidine  ¨ Blood thinner (including clopidogrel, prasugrel, ticagrelor, warfarin)  ¨ Blood pressure medicine  ¨ Medicine to treat seizures (including phenytoin, valproic acid)  ¨ NSAID pain or arthritis medicine (including celecoxib, diclofenac, ibuprofen, naproxen)  ¨ Steroid medicine (including dexamethasone, hydrocortisone, methylprednisolone, prednisolone, prednisone)  · Do not take Durlaza 2 hours before or 1 hour after you drink alcohol or take medicines that contain alcohol  Warnings While Using This Medicine:   · Tell your doctor if you are pregnant or breastfeeding  Do not use this medicine during the later part of a pregnancy unless your doctor tells you to  · Tell your doctor if you have kidney disease, liver disease, high blood pressure, heart disease, or a history of stomach bleeding or ulcers    · This medicine may increase your risk for bleeding, including stomach ulcers  · Do not give aspirin to a child or teenager who has chickenpox or flu symptoms, unless the doctor says it is okay  Aspirin can cause a life-threatening reaction called Reye syndrome  · Tell any doctor or dentist who treats you that you are using this medicine  This medicine may affect certain medical test results  · Keep all medicine out of the reach of children  Never share your medicine with anyone  Possible Side Effects While Using This Medicine:   Call your doctor right away if you notice any of these side effects:  · Allergic reaction: Itching or hives, swelling in your face or hands, swelling or tingling in your mouth or throat, chest tightness, trouble breathing  · Bloody or black stools, bloody vomit or vomit that looks like coffee grounds  · Chest tightness, wheezing  · Ringing in the ears  · Severe stomach pain  · Unusual bleeding, bruising, or weakness  If you notice other side effects that you think are caused by this medicine, tell your doctor  Call your doctor for medical advice about side effects  You may report side effects to FDA at 6-011-FDA-9106  © 2017 [employer] Information is for End User's use only and may not be sold, redistributed or otherwise used for commercial purposes  The above information is an  only  It is not intended as medical advice for individual conditions or treatments  Talk to your doctor, nurse or pharmacist before following any medical regimen to see if it is safe and effective for you

## 2018-04-23 NOTE — DISCHARGE SUMMARY
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 including amputation of her ring finger, hitting herself in the head with a hammer and several superficial stab wants to her chest   She indicated that she was having a spiritual experiences in the form of God and blessed Roberto Perkins and Gamal Sky were communicating with her, telling her to atone herself by inflicting these injuries   She also considers herself a theta healer and was trying some of that on herself  Although she had reported some periods of sadness but did not believe that those were the reasons for her to harm herself because she did not have an intention to kill herself and she was just following God's commands for atonement  all current active meds have been reviewed       No Known Allergies    Objective     Vital signs in last 24 hours:  Temp:  [97 7 °F (36 5 °C)-98 2 °F (36 8 °C)] 97 7 °F (36 5 °C)  HR:  [67-73] 73  Resp:  [16] 16  BP: ()/(59-63) 121/63    No intake or output data in the 24 hours ending 04/23/18 [address] [place] [address] Course: The patient was admitted to the inpatient psychiatric unit and started on every 15 minutes precautions  During the hospitalization the patient was attending individual therapy, group therapy, milieu therapy and occupational therapy  Psychiatric medications were titrated over the hospital stay  To address self-abusive behavior and psychotic symptoms the patient was started on antipsychotic medication Zyprexa and Haldol  Medication doses were titrated during the hospital course   Prior to beginning of treatment medications risks and benefits and possible side effects including risk of parkinsonian symptoms, Tardive Dyskinesia and metabolic syndrome related to treatment with antipsychotic medications were reviewed with the patient  The patient verbalized understanding and agreement for treatment  The patient was first started on olanzapine which she initially refused to take  A second opinion was obtained and she was given injections of olanzapine up to 20 mg daily which did not improve her psychotic symptoms  She continued to have auditory and visual hallucinations and was meditating and communicating with God and other [employer] figures  At that time her medications were switched to haloperidol and the dose was gradually increased to 10 mg twice daily  In the meantime, olanzapine was tapered off  The patient reported improvement of her mood and decrease in frequency and intensity of her hallucinations and started taking medications by mouth  She gained insight into the nature of her illness and realized that her hallucinations were a manifestation of her psychosis  Besides an inner feeling of shakiness and jitteriness, she did not have any other side effects with Haldol  For that, Cogentin was prescribed at 1 mg twice daily which was helpful  Patient's symptoms improved gradually over the hospital course  At the end of treatment the patient was doing well  Mood was stable at the time of discharge  The patient denied suicidal ideation, intent or plan at the time of discharge and denied homicidal ideation, intent or plan at the time of discharge  There was no overt psychosis at the time of discharge  Sleep and appetite were improved  The patient was tolerating medications and was not reporting any significant side effects at the time of discharge  Since the patient was doing well at the end of the hospitalization, treatment team felt that the patient could be safely discharged to outpatient care    She decided to stay with her friend for at least a week prior to going back to her own home and 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  Prior to discharge, I had a meeting with the patient and her best friend, with whom she will be staying a over the next week and discussed my findings and recommendations  Her friend wanted to know about the warning signs  Any changes in behavior or mood should be communicated with the psychiatrist   The patient implied herself that she has learned a lot about her condition and will not allow the same thing from happening again  The outpatient follow up with a psychiatrist was arranged by the unit  upon discharge      Mental Status at Time of Discharge:   Appearance:  Adequate hygiene and grooming and Good eye contact   Behavior:  calm, cooperative and friendly   Speech:   Language: Normal rate and Normal volume  No overt abnormality   Mood:  euthymic   Affect:   Associations: appropriate and broad  Tightly connected   Thought Process:  Goal directed and coherent   Thought Content:  Does not verbalize delusional material   Perceptual Disturbances: Denies hallucinations and does not appear to be responding to internal stimuli     Risk Potential: No suicidal or homicidal ideation   Orientation   Language Oriented x 3  anomia No   Memory  Fund of knowledge grossly intact  aware of current events   Attention/Concentration attention span and concentration were age appropriate   Insight:  Good insight   Judgment: Good judgment   Gait/Station: normal gait/station and normal balance   Motor Activity: No abnormal movement noted       Admission Diagnosis:  Principal Problem:    Psychosis, atypical  Active Problems:    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    Suicidal behavior with attempted self-injury ([facility] [address] )    Drug-induced tremor      Discharge Diagnosis:     Principal Problem:    Psychosis, atypical  Active Problems:    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    Suicidal behavior with attempted self-injury [facility])    Drug-induced tremor  Resolved Problems:    Swelling of right lower extremity      Lab results:    Admission on 04/02/2018   Component Date Value    Sodium 04/09/2018 139     Potassium 04/09/2018 3 7     Chloride 04/09/2018 109*    CO2 04/09/2018 20*    Anion Gap 04/09/2018 10     BUN 04/09/2018 20     Creatinine 04/09/2018 0 55*    Glucose 04/09/2018 86     Glucose, Fasting 04/09/2018 86     Calcium 04/09/2018 8 7     AST 04/09/2018 50*    ALT 04/09/2018 48     Alkaline Phosphatase 04/09/2018 157*    Total Protein 04/09/2018 7 1     Albumin 04/09/2018 3 1*    Total Bilirubin 04/09/2018 0 84     eGFR 04/09/2018 104     WBC 04/09/2018 8 61     RBC 04/09/2018 2 94*    Hemoglobin 04/09/2018 9 2*    Hematocrit 04/09/2018 26 7*    MCV 04/09/2018 91     MCH 04/09/2018 31 3     MCHC 04/09/2018 34 5     RDW 04/09/2018 13 6     MPV 04/09/2018 8 2*    Platelets 90/24/6013 286     nRBC 04/09/2018 0     Neutrophils Relative 04/09/2018 68     Lymphocytes Relative 04/09/2018 22     Monocytes Relative 04/09/2018 8     Eosinophils Relative 04/09/2018 2     Basophils Relative 04/09/2018 0     Neutrophils Absolute 04/09/2018 5 90     Lymphocytes Absolute 04/09/2018 1 85     Monocytes Absolute 04/09/2018 0 65     Eosinophils Absolute 04/09/2018 0 15     Basophils Absolute 04/09/2018 0 02     TSH 3RD GENERATON 04/09/2018 0 647     WBC 04/12/2018 7 12     RBC 04/12/2018 2 92*    Hemoglobin 04/12/2018 9 0*    Hematocrit 04/12/2018 26 9*    MCV 04/12/2018 92     MCH 04/12/2018 30 8     MCHC 04/12/2018 33 5     RDW 04/12/2018 13 4     Platelets 40/27/6078 355     MPV 04/12/2018 8 1*    Sodium 04/12/2018 139     Potassium 04/12/2018 3 7     Chloride 04/12/2018 109*    CO2 04/12/2018 22     Anion Gap 04/12/2018 8     BUN 04/12/2018 12     Creatinine 04/12/2018 0 61     Glucose 04/12/2018 89     Calcium 04/12/2018 8 7     eGFR 04/12/2018 101     AntiThrombIN III Activity 04/12/2018 112     Anticardiolipin IgA 04/12/2018 <9     Anticardiolipin IgG 04/12/2018 <9     Anticardiolipin IgM 04/12/2018 <9     Factor V Leiden 04/12/2018 Comment     PTT Lupus Anticoagulant 04/12/2018 39 0     Dilute Viper Venom Time 04/12/2018 41 2     DILUTE PROTHROMBIN TIME(* 04/12/2018 44 4     THROMBIN TIME (DRVW) 04/12/2018 18 6     DPT CONFIRM RATIO 04/12/2018 0 84     LUPUS REFLEX INTERPRETAT* 04/12/2018 Comment:     Protein C Activity 04/12/2018 106     Protein S Activity 04/12/2018 90     Protein S Ag, Total 04/12/2018 92     Protein S Ag, Free 04/12/2018 106     Prothrombin Mutation 04/12/2018 Comment     Beta-2 Glyco 1 IgG 04/12/2018 <9     Beta-2 Glyco 1 IgA 04/12/2018 <9     Beta-2 Glyco 1 IgM 04/12/2018 <9     WBC 04/15/2018 5 33     RBC 04/15/2018 2 91*    Hemoglobin 04/15/2018 8 9*    Hematocrit 04/15/2018 27 6*    MCV 04/15/2018 95     MCH 04/15/2018 30 6     MCHC 04/15/2018 32 2     RDW 04/15/2018 13 7     MPV 04/15/2018 8 3*    Platelets 90/87/8755 432*    nRBC 04/15/2018 0     Neutrophils Relative 04/15/2018 47     Lymphocytes Relative 04/15/2018 43     Monocytes Relative 04/15/2018 6     Eosinophils Relative 04/15/2018 3     Basophils Relative 04/15/2018 1     Neutrophils Absolute 04/15/2018 2 48     Lymphocytes Absolute 04/15/2018 2 30     Monocytes Absolute 04/15/2018 0 31     Eosinophils Absolute 04/15/2018 0 17     Basophils Absolute 04/15/2018 0 05     RPR 04/15/2018 Non-Reactive     Total Bilirubin 04/15/2018 0 34     Bilirubin, Direct 04/15/2018 0 10     Alkaline Phosphatase 04/15/2018 122*    AST 04/15/2018 17     ALT 04/15/2018 23     Total Protein 04/15/2018 6 6     Albumin 04/15/2018 2 7*    WBC 04/16/2018 5 53     RBC 04/16/2018 2 94*    Hemoglobin 04/16/2018 9 1*    Hematocrit 04/16/2018 28 1*    MCV 04/16/2018 96     MCH 04/16/2018 31 0     MCHC 04/16/2018 32 4     RDW 04/16/2018 13 9     MPV 04/16/2018 8 0*    Platelets 83/82/9007 420*    nRBC 04/16/2018 0     Neutrophils Relative 04/16/2018 47     Lymphocytes Relative 04/16/2018 40     Monocytes Relative 04/16/2018 10     Eosinophils Relative 04/16/2018 2     Basophils Relative 04/16/2018 1     Neutrophils Absolute 04/16/2018 2 59     Lymphocytes Absolute 04/16/2018 2 20     Monocytes Absolute 04/16/2018 0 56     Eosinophils Absolute 04/16/2018 0 13     Basophils Absolute 04/16/2018 0 04     Ventricular Rate 04/16/2018 95     Atrial Rate 04/16/2018 95     PA Interval 04/16/2018 154     QRSD Interval 04/16/2018 76     QT Interval 04/16/2018 348     QTC Interval 04/16/2018 437     P Axis 04/16/2018 52     QRS Axis 04/16/2018 23     T Wave Axis 04/16/2018 19     Vitamin B-12 04/20/2018 597        Discharge Medications:    See after visit summary for reconciled discharge medications provided to patient and family  Discharge instructions/Information to patient and family:     See after visit summary for information provided to patient and family  Provisions for Follow-Up Care:    See after visit summary for information related to follow-up care and any pertinent home health orders  Discharge Statement     I spent 30 minutes discharging the patient  This time was spent on the day of discharge  I had direct contact with the patient on the day of discharge  Additional documentation is required if more than 30 minutes were spent on discharge:    I reviewed with Luther Goldmann importance of compliance with medications and outpatient treatment after discharge  I discussed the medication regimen and possible side effects of the medications with Luther Goldmann prior to discharge  At the time of discharge she was tolerating psychiatric medications  I discussed outpatient follow up with Luther Goldmann  I reviewed with Luther Goldmann crisis plan and safety plan upon discharge    She is fully aware of risk of recurrence of her symptoms and has no intention to stop taking her medications    She indicated that she was living with someone who had mental illness and would frequently stop taking his medications and have recurrences and she does not want to end up like him

## 2018-04-23 NOTE — CASE MANAGEMENT
met with pt and she is aware she has an appointment on 5/4 at 1:00 for an intake at [facility]  She is also aware and agreeable to keep an appointment with her orthopedic surgeon here at the [facility] with Dr Jany Hines  For follow=up on her hand  Pt has called her friend Shira Lake to pick her up today at noon and Dr Guera Patterson has agreed to meet with pt and Shira Lake to discuss possible warning signs of relapse in the future  Pt is looking forward to dc and returning to work next week

## 2018-04-23 NOTE — PROGRESS NOTES
Pt calm, pleasant, cooperative, and continues to be med compliant  Pt bright upon approach and is excited and hopeful about D/C  Pt denies all s/s at current  Will continue to monitor

## 2018-04-23 NOTE — NURSING NOTE
Discharge instructions explained to pt  All questions answered  Scripts given to pt  Pt had all belongings

## 2018-04-26 ENCOUNTER — OFFICE VISIT (OUTPATIENT)
Dept: OBGYN CLINIC | Facility: HOSPITAL | Age: 58
End: 2018-04-26

## 2018-04-26 VITALS — DIASTOLIC BLOOD PRESSURE: 85 MMHG | HEIGHT: 69 IN | SYSTOLIC BLOOD PRESSURE: 124 MMHG | HEART RATE: 90 BPM
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
## 2018-04-26 DIAGNOSIS — S68.115A AMPUTATION OF LEFT RING FINGER: Primary | ICD-10-CM

## 2018-04-26 PROCEDURE — 99024 POSTOP FOLLOW-UP VISIT: CPT | Performed by: ORTHOPAEDIC SURGERY

## 2018-04-26 NOTE — PROGRESS NOTES
63 y o female presents to the office roughly 1 month status post left ring finger amputation on 03/30/2018  She is experiencing minimal pain today in the office    Review of Systems  Review of systems negative unless otherwise specified in HPI    Past Medical History  Past Medical History:   Diagnosis Date    Acute psychosis 3/31/2018       Past Surgical History  Past Surgical History:   Procedure Laterality Date    AMPUTATION FINGER / THUMB Left     ring, self inflicted    FRACTURE SURGERY      L ring finger       Current Medications  Current Outpatient Prescriptions on File Prior to Visit   Medication Sig Dispense Refill    aspirin (ECOTRIN LOW STRENGTH) 81 mg EC tablet Take 1 tablet (81 mg total) by mouth daily 30 tablet 0    benztropine (COGENTIN) 1 mg tablet Take 1 tablet (1 mg total) by mouth 2 (two) times a day 60 tablet 0    haloperidol (HALDOL) 10 mg tablet Take 1 tablet (10 mg total) by mouth 2 (two) times a day 60 tablet 1     No current facility-administered medications on file prior to visit  Recent Labs [facility])    0  Lab Value Date/Time   HCT 28 1 (L) 04/16/2018 0630   HGB 9 1 (L) 04/16/2018 0630   WBC 5 53 04/16/2018 0630   INR 1 30 (H) 03/30/2018 1124   GLUCOSE 89 04/12/2018 1552   GLUCOSE 186 (H) 03/30/2018 1105         Physical exam  · General: Awake, Alert, Oriented  · Eyes: Pupils equal, round and reactive to light  · Heart: regular rate and rhythm  · Lungs: No audible wheezing  · Abdomen: soft  left upper extremity  · Wound well healed  · Neurovascularly intact      Imaging  None    Procedure  Sutures removed    Assessment/Plan:   63 y  o female is 1 month status post left finger amputation  She will start physical therapy  We will see her back in 6 weeks

## 2018-04-27 NOTE — CASE MANAGEMENT
On 4/26/18 @ 1:00 pm received call from Morteza Nicholson @ [phone] ( Adult Protective Services Investigator with [facility] contracted with [place] [employer]) requesting discharge summary of care;  Pt singed release of information for Adult Protective Services prior to discharge; summary of care faxed to Morteza Nicholson as requested (fax: [phone])

## 2018-05-04 RX ORDER — HALOPERIDOL 10 MG/1
10 TABLET ORAL 2 TIMES DAILY
Refills: 1 | COMMUNITY
Start: 2018-04-23 | End: 2018-10-02 | Stop reason: ALTCHOICE

## 2018-05-04 RX ORDER — BENZTROPINE MESYLATE 1 MG/1
1 TABLET ORAL 2 TIMES DAILY
Refills: 0 | COMMUNITY
Start: 2018-04-23 | End: 2018-10-02 | Stop reason: ALTCHOICE

## 2018-05-07 ENCOUNTER — OFFICE VISIT (OUTPATIENT)
Dept: FAMILY MEDICINE CLINIC | Facility: HOSPITAL | Age: 58
End: 2018-05-07
Payer: COMMERCIAL

## 2018-05-07 VITALS
WEIGHT: 190 LBS | HEIGHT: 69 IN | DIASTOLIC BLOOD PRESSURE: 74 MMHG | HEART RATE: 104 BPM | BODY MASS INDEX: 28.14 KG/M2 | SYSTOLIC BLOOD PRESSURE: 122 MMHG

## 2018-05-07 DIAGNOSIS — Z00.00 HEALTH CARE MAINTENANCE: Primary | ICD-10-CM

## 2018-05-07 DIAGNOSIS — F99 CHRONIC MENTAL ILLNESS: ICD-10-CM

## 2018-05-07 PROCEDURE — 99386 PREV VISIT NEW AGE 40-64: CPT | Performed by: INTERNAL MEDICINE

## 2018-05-07 NOTE — PROGRESS NOTES
Assessment/Plan:     Health care maintenance  Here to establish care,      Chronic mental illness  Recent sty in behavioral health unit and is to follow up with them       Diagnoses and all orders for this visit:    Health care maintenance    Chronic mental illness    Other orders  -     benztropine (COGENTIN) 1 mg tablet; Take 1 mg by mouth 2 (two) times a day  -     haloperidol (HALDOL) 10 mg tablet; Take 10 mg by mouth 2 (two) times a day         Subjective:     Patient ID: Jazz Post is a 63 y o  female  Here to establish    patient declines any further tests or screenings  Declines mammo, although she did have one in past year, declines colonoscopy  Review of Systems   Constitutional: Negative for fatigue and fever  HENT: Negative for hearing loss  Eyes: Negative for visual disturbance  Respiratory: Negative for cough, chest tightness, shortness of breath and wheezing  Cardiovascular: Negative for chest pain, palpitations and leg swelling  Gastrointestinal: Negative for abdominal pain, diarrhea and nausea  Genitourinary: Negative for dysuria and hematuria  Musculoskeletal: Negative for arthralgias  Neurological: Negative for dizziness, numbness and headaches  Psychiatric/Behavioral: Negative for confusion and dysphoric mood  All other systems reviewed and are negative  Objective:     Physical Exam   Constitutional: She is oriented to person, place, and time  She appears well-developed and well-nourished  Eyes: Pupils are equal, round, and reactive to light  Neck: Normal range of motion  Neck supple  No thyromegaly present  Cardiovascular: Normal rate, regular rhythm, normal heart sounds and intact distal pulses  No murmur heard  Pulmonary/Chest: Effort normal and breath sounds normal  She has no wheezes  She has no rales  Abdominal: Soft  Bowel sounds are normal  There is no tenderness  Musculoskeletal: Normal range of motion   She exhibits no edema, tenderness or deformity  Lymphadenopathy:     She has no cervical adenopathy  Neurological: She is alert and oriented to person, place, and time  She has normal reflexes  Skin: Skin is warm and dry  Psychiatric: She has a normal mood and affect  Vitals:    05/07/18 1015   BP: 122/74   Pulse: 104   Weight: 86 2 kg (190 lb)   Height: 5' 9" (1 753 m)       Transitional Care Management Review:  El Dugan is a 63 y o  female here for TCM follow up       During the TCM phone call patient stated:               Vivian Ferreira MD

## 2018-05-07 NOTE — PATIENT INSTRUCTIONS
Your visit today was to establish a relationship with our office and to establish care  We hope that you feel welcomed and confident that we will address your health care needs as needed  The doctor or nurse reviewed your health information with you, all medications and some family and social history  If the doctor has ordered labs or tests, please do this soon and then schedule a follow up visit as requested  Recommend mammogram and colonoscopy  All labs are good

## 2018-10-02 ENCOUNTER — OFFICE VISIT (OUTPATIENT)
Dept: FAMILY MEDICINE CLINIC | Facility: HOSPITAL | Age: 58
End: 2018-10-02
Payer: COMMERCIAL

## 2018-10-02 VITALS
DIASTOLIC BLOOD PRESSURE: 74 MMHG | HEIGHT: 69 IN | HEART RATE: 78 BPM | SYSTOLIC BLOOD PRESSURE: 108 MMHG | BODY MASS INDEX: 22.81 KG/M2 | WEIGHT: 154 LBS

## 2018-10-02 DIAGNOSIS — Z86.718 HISTORY OF DEEP VEIN THROMBOSIS (DVT) OF LOWER EXTREMITY: Primary | ICD-10-CM

## 2018-10-02 DIAGNOSIS — D64.9 ANEMIA, UNSPECIFIED TYPE: ICD-10-CM

## 2018-10-02 DIAGNOSIS — Z00.00 HEALTH CARE MAINTENANCE: ICD-10-CM

## 2018-10-02 DIAGNOSIS — R63.4 WEIGHT LOSS: ICD-10-CM

## 2018-10-02 DIAGNOSIS — L70.9 ACNE, UNSPECIFIED ACNE TYPE: ICD-10-CM

## 2018-10-02 PROCEDURE — 1036F TOBACCO NON-USER: CPT | Performed by: PHYSICIAN ASSISTANT

## 2018-10-02 PROCEDURE — 99213 OFFICE O/P EST LOW 20 MIN: CPT | Performed by: PHYSICIAN ASSISTANT

## 2018-10-02 NOTE — PROGRESS NOTES
Assessment/Plan:         Diagnoses and all orders for this visit:    History of deep vein thrombosis (DVT) of lower extremity  -     VAS lower limb venous duplex study, unilateral/limited; Future  -     CBC and differential; Future  -     Comprehensive metabolic panel; Future  -     Lipid panel; Future  -     TSH, 3rd generation with Free T4 reflex; Future  -     Ferritin; Future  -     Vitamin B12; Future    Anemia, unspecified type  -     CBC and differential; Future  -     Comprehensive metabolic panel; Future  -     Lipid panel; Future  -     TSH, 3rd generation with Free T4 reflex; Future  -     Ferritin; Future  -     Vitamin B12; Future    Acne, unspecified acne type  -     tretinoin (RETIN-A) 0 025 % cream; Apply topically daily at bedtime        Subjective:      Patient ID: Solo Valiente is a 63 y o  female  63year old white female presents for follow up on DVT dx  In April, right lower ext  Has no swelling, or sx/pain, but would like to get follow up exam     Denies doing prolonged sitting, or recent travels  Claims has altered diet, and eats more fiber, and protein now  Was under psych care, but released past summer  Requesting cream for face, refill on Retin-A  Review of Systems   Constitutional: Negative for chills, diaphoresis, fatigue and fever  Respiratory: Negative for cough and shortness of breath  Musculoskeletal: Negative for arthralgias, back pain, myalgias, neck pain and neck stiffness  Neurological: Negative for dizziness, light-headedness, numbness and headaches  Psychiatric/Behavioral: Positive for sleep disturbance  Negative for self-injury and suicidal ideas  Objective:      /74   Pulse 78   Ht 5' 9" (1 753 m)   Wt 69 9 kg (154 lb)   LMP  (LMP Unknown)   BMI 22 74 kg/m²          Physical Exam   Constitutional: She is oriented to person, place, and time  She appears well-developed and well-nourished  No distress     Eyes: Conjunctivae and EOM are normal  Right eye exhibits no discharge  Left eye exhibits no discharge  No scleral icterus  Cardiovascular: Normal rate, regular rhythm and normal heart sounds  Pulmonary/Chest: Effort normal and breath sounds normal  No respiratory distress  She has no wheezes  She has no rales  Musculoskeletal: Normal range of motion  She exhibits no edema or tenderness  Neurological: She is alert and oriented to person, place, and time  Skin: She is not diaphoretic  Psychiatric: She has a normal mood and affect  Her behavior is normal  Judgment and thought content normal    Nursing note and vitals reviewed

## 2018-11-13 LAB
ALBUMIN SERPL-MCNC: 4.7 G/DL (ref 3.5–5.5)
ALBUMIN/GLOB SERPL: 1.9 {RATIO} (ref 1.2–2.2)
ALP SERPL-CCNC: 118 IU/L (ref 39–117)
ALT SERPL-CCNC: 26 IU/L (ref 0–32)
AST SERPL-CCNC: 24 IU/L (ref 0–40)
BASOPHILS # BLD AUTO: 0 X10E3/UL (ref 0–0.2)
BASOPHILS NFR BLD AUTO: 1 %
BILIRUB SERPL-MCNC: 0.2 MG/DL (ref 0–1.2)
BUN SERPL-MCNC: 18 MG/DL (ref 6–24)
BUN/CREAT SERPL: 26 (ref 9–23)
CALCIUM SERPL-MCNC: 9.8 MG/DL (ref 8.7–10.2)
CHLORIDE SERPL-SCNC: 104 MMOL/L (ref 96–106)
CHOLEST SERPL-MCNC: 205 MG/DL (ref 100–199)
CO2 SERPL-SCNC: 24 MMOL/L (ref 20–29)
CREAT SERPL-MCNC: 0.7 MG/DL (ref 0.57–1)
EOSINOPHIL # BLD AUTO: 0.2 X10E3/UL (ref 0–0.4)
EOSINOPHIL NFR BLD AUTO: 4 %
ERYTHROCYTE [DISTWIDTH] IN BLOOD BY AUTOMATED COUNT: 13.6 % (ref 12.3–15.4)
FERRITIN SERPL-MCNC: 32 NG/ML (ref 15–150)
GLOBULIN SER-MCNC: 2.5 G/DL (ref 1.5–4.5)
GLUCOSE SERPL-MCNC: 107 MG/DL (ref 65–99)
HCT VFR BLD AUTO: 42.2 % (ref 34–46.6)
HDLC SERPL-MCNC: 82 MG/DL
HGB BLD-MCNC: 14.1 G/DL (ref 11.1–15.9)
IMM GRANULOCYTES # BLD: 0 X10E3/UL (ref 0–0.1)
IMM GRANULOCYTES NFR BLD: 0 %
LABCORP COMMENT: NORMAL
LDLC SERPL CALC-MCNC: 111 MG/DL (ref 0–99)
LYMPHOCYTES # BLD AUTO: 2.4 X10E3/UL (ref 0.7–3.1)
LYMPHOCYTES NFR BLD AUTO: 40 %
MCH RBC QN AUTO: 31.8 PG (ref 26.6–33)
MCHC RBC AUTO-ENTMCNC: 33.4 G/DL (ref 31.5–35.7)
MCV RBC AUTO: 95 FL (ref 79–97)
MONOCYTES # BLD AUTO: 0.2 X10E3/UL (ref 0.1–0.9)
MONOCYTES NFR BLD AUTO: 4 %
NEUTROPHILS # BLD AUTO: 3.1 X10E3/UL (ref 1.4–7)
NEUTROPHILS NFR BLD AUTO: 51 %
PLATELET # BLD AUTO: 355 X10E3/UL (ref 150–379)
POTASSIUM SERPL-SCNC: 4.5 MMOL/L (ref 3.5–5.2)
PROT SERPL-MCNC: 7.2 G/DL (ref 6–8.5)
RBC # BLD AUTO: 4.44 X10E6/UL (ref 3.77–5.28)
SL AMB EGFR AFRICAN AMERICAN: 111 ML/MIN/1.73
SL AMB EGFR NON AFRICAN AMERICAN: 96 ML/MIN/1.73
SL AMB VLDL CHOLESTEROL CALC: 12 MG/DL (ref 5–40)
SODIUM SERPL-SCNC: 142 MMOL/L (ref 134–144)
TRIGL SERPL-MCNC: 61 MG/DL (ref 0–149)
TSH SERPL DL<=0.005 MIU/L-ACNC: 2.47 UIU/ML (ref 0.45–4.5)
VIT B12 SERPL-MCNC: 670 PG/ML (ref 232–1245)
WBC # BLD AUTO: 6 X10E3/UL (ref 3.4–10.8)

## 2018-11-14 ENCOUNTER — TELEPHONE (OUTPATIENT)
Dept: FAMILY MEDICINE CLINIC | Facility: HOSPITAL | Age: 58
End: 2018-11-14

## 2018-11-26 ENCOUNTER — TELEPHONE (OUTPATIENT)
Dept: FAMILY MEDICINE CLINIC | Facility: HOSPITAL | Age: 58
End: 2018-11-26

## 2018-11-26 ENCOUNTER — HOSPITAL ENCOUNTER (OUTPATIENT)
Dept: NON INVASIVE DIAGNOSTICS | Facility: HOSPITAL | Age: 58
Discharge: HOME | End: 2018-11-26
Payer: COMMERCIAL

## 2018-11-26 DIAGNOSIS — Z86.718 HISTORY OF DEEP VEIN THROMBOSIS (DVT) OF LOWER EXTREMITY: ICD-10-CM

## 2018-11-26 PROCEDURE — 93971 EXTREMITY STUDY: CPT | Performed by: SURGERY

## 2018-11-26 PROCEDURE — 93971 EXTREMITY STUDY: CPT

## 2018-11-26 NOTE — TELEPHONE ENCOUNTER
Zamzam Shen from [address] called    Pt was there today for test   Still shows some thrombus

## 2018-12-26 ENCOUNTER — TELEPHONE (OUTPATIENT)
Dept: FAMILY MEDICINE CLINIC | Facility: HOSPITAL | Age: 58
End: 2018-12-26

## 2018-12-26 DIAGNOSIS — L70.9 ACNE, UNSPECIFIED ACNE TYPE: ICD-10-CM

## 2018-12-26 RX ORDER — TRETINOIN 0.5 MG/G
CREAM TOPICAL
Qty: 45 G | Refills: 3 | Status: SHIPPED | OUTPATIENT
Start: 2018-12-26

## 2019-01-09 NOTE — PROGRESS NOTES
Client was observed in the milieu throughout the day, off and on  Client spent a large amount of time isolated in her room or off on her own in the activity room; client stated she was meditating  Client denied any SI/HI, but reported auditory hallucinations of [employer] leaders speaking to her  Client denied any pain  Client refused her morning application of Bacitracin and refused her afternoon Zyprexa  Client is meds over objection, and was explained that if she did not take her Zyprexa orally, she would be given an IM injection  Client continued to refuse, and was given an IM injection of Zyprexa in the left deltoid  Client tolerated it well  Client remains in the small activity room "meditating"  Will continue to monitor  Exchanged over wire

## 2020-12-28 NOTE — PROGRESS NOTES
Pt pleasant and cooperative this shift  Continues to have good eye contact  Med compliant by mouth  Pt had visitor this shift  Visible in the milieu  Participated in group  Denies SI  Will continue to monitor  [Mother] : mother [Sugar drinks] : sugar drinks [Fruit] : fruit [Vegetables] : vegetables [Meat] : meat [Grains] : grains [Eggs] : eggs [Fish] : fish [Dairy] : dairy [Eats meals with family] : eats meals with family [Normal] : Normal [In own bed] : In own bed [Brushing teeth twice/d] : brushing teeth twice per day [Yes] : Patient goes to dentist yearly [Appropiate parent-child-sibling interaction] : appropriate parent-child-sibling interaction [Has Friends] : has friends [Has chance to make own decisions] : has chance to make own decisions [Grade ___] : Grade [unfilled] [Adequate social interactions] : adequate social interactions [Adequate behavior] : adequate behavior [Adequate performance] : adequate performance [Adequate attention] : adequate attention [No difficulties with Homework] : no difficulties with homework [No] : No cigarette smoke exposure [Appropriately restrained in motor vehicle] : appropriately restrained in motor vehicle [Supervised outdoor play] : supervised outdoor play [Supervised around water] : supervised around water [Parent knows child's friends] : parent knows child's friends [Parent discusses safety rules regarding adults] : parent discusses safety rules regarding adults [Monitored computer use] : monitored computer use [Gun in Home] : no gun in home [Exposure to tobacco] : no exposure to tobacco [Exposure to alcohol] : no exposure to alcohol [Exposure to electronic nicotine delivery system] : No exposure to electronic nicotine delivery system [Exposure to illicit drugs] : no exposure to illicit drugs [Wears helmet and pads] : does not wear helmet and pads [Family discusses home emergency plan] : family does not discuss home emergency plan [FreeTextEntry7] : positive covid antibodies.

## 2022-01-01 NOTE — PROGRESS NOTES
Progress Note - Behavioral Health   Maxine Stephenson 63 y o  female MRN: [id number]  Unit/Bed#: MM4 107-64 Encounter: [id number]    The patient was seen for continuing care and reviewed with treatment team   She continues to refuse medications and has no insight  She does not believe that there is anything wrong with her that would improve with psychotropic medications  She has been sleeping and eating well  She continues to spend most of her time in her room meditating and communicating with God  Mental Status Evaluation:  Appearance:  Good eye contact and disheveled   Behavior:  calm, evasive, guarded and Superficial   Fund of knowledge  Not assessed   Speech:   Language: Normal rate and Normal volume  No overt abnormality   Mood:  euthymic   Affect:   Associations: appropriate and broad  Tightly connected   Thought Process:  Goal directed and coherent   Thought Content:  Delusions of guilt, Grandiose delusions and Religiously preoccupied   Perceptual Disturbances: Auditory hallucinations with commands from God   Risk Potential: No suicidal or homicidal ideation   Orientation  Oriented x 3   Memory Not tested   Attention/Concentration attention span and concentration were age appropriate   Insight:  No insight   Judgment: Poor judgment   Gait/Station: normal gait/station and normal balance   Motor Activity: No abnormal movement noted     Progress Toward Goals:  No changes    Assessment/Plan    Principal Problem:    Acute psychosis      Recommended Treatment: We will obtain a second opinion to force medications  Check TSH  Continue with pharmacotherapy, group therapy, milieu therapy and occupational therapy    The patient will be maintained on the following medications:    Current Facility-Administered Medications:  acetaminophen 650 mg Oral Q6H PRN Frederick Gage MD   bacitracin 1 large application Topical BID SANDRA Landrum   haloperidol 5 mg Oral Q8H PRN Frederick Gage MD   haloperidol lactate 5 mg Intramuscular Q8H PRN Nestor Aldana MD   ibuprofen 400 mg Oral Q8H PRN Nestor Aldana MD   LORazepam 1 mg Intramuscular Q6H PRN Nestor Aldana MD   LORazepam 1 mg Oral Q6H PRN Nestor Aldana MD   OLANZapine 10 mg Intramuscular Q8H PRN Nestor Aldana MD   OLANZapine 5 mg Oral HS Nestor Aldana MD   risperiDONE 1 mg Oral Q8H PRN Nestor Aldana MD   traMADol 50 mg Oral Q6H PRN Nestor Aldana MD   zolpidem 5 mg Oral HS PRN Nestor Aldana MD       Risks, benefits and possible side effects of Medications:   Patient does not verbalize understanding at this time and will require further explanation  Rosana Garrett  (RN)  2022 19:16:12 Arturo Benton)  2022 20:50:38